# Patient Record
Sex: MALE | Race: WHITE | Employment: UNEMPLOYED | URBAN - METROPOLITAN AREA
[De-identification: names, ages, dates, MRNs, and addresses within clinical notes are randomized per-mention and may not be internally consistent; named-entity substitution may affect disease eponyms.]

---

## 2019-08-28 ENCOUNTER — APPOINTMENT (OUTPATIENT)
Dept: GENERAL RADIOLOGY | Age: 34
End: 2019-08-28
Payer: COMMERCIAL

## 2019-08-28 ENCOUNTER — HOSPITAL ENCOUNTER (EMERGENCY)
Age: 34
Discharge: HOME | End: 2019-08-28
Attending: EMERGENCY MEDICINE
Payer: COMMERCIAL

## 2019-08-28 VITALS
SYSTOLIC BLOOD PRESSURE: 133 MMHG | RESPIRATION RATE: 16 BRPM | BODY MASS INDEX: 28.92 KG/M2 | WEIGHT: 202 LBS | TEMPERATURE: 98.2 F | HEIGHT: 70 IN | HEART RATE: 70 BPM | DIASTOLIC BLOOD PRESSURE: 95 MMHG | OXYGEN SATURATION: 95 %

## 2019-08-28 DIAGNOSIS — R07.89 OTHER CHEST PAIN: Primary | ICD-10-CM

## 2019-08-28 LAB
A/G RATIO: 1.6 (ref 1.1–2.2)
ALBUMIN SERPL-MCNC: 4.7 G/DL (ref 3.4–5)
ALP BLD-CCNC: 91 U/L (ref 40–129)
ALT SERPL-CCNC: 41 U/L (ref 10–40)
ANION GAP SERPL CALCULATED.3IONS-SCNC: 12 MMOL/L (ref 3–16)
AST SERPL-CCNC: 47 U/L (ref 15–37)
BASOPHILS ABSOLUTE: 0.1 K/UL (ref 0–0.2)
BASOPHILS RELATIVE PERCENT: 1.4 %
BILIRUB SERPL-MCNC: <0.2 MG/DL (ref 0–1)
BUN BLDV-MCNC: 10 MG/DL (ref 7–20)
CALCIUM SERPL-MCNC: 9.5 MG/DL (ref 8.3–10.6)
CHLORIDE BLD-SCNC: 100 MMOL/L (ref 99–110)
CO2: 23 MMOL/L (ref 21–32)
CREAT SERPL-MCNC: 1.1 MG/DL (ref 0.9–1.3)
EKG ATRIAL RATE: 81 BPM
EKG DIAGNOSIS: NORMAL
EKG P AXIS: 45 DEGREES
EKG P-R INTERVAL: 162 MS
EKG Q-T INTERVAL: 426 MS
EKG QRS DURATION: 168 MS
EKG QTC CALCULATION (BAZETT): 494 MS
EKG R AXIS: -64 DEGREES
EKG T AXIS: 90 DEGREES
EKG VENTRICULAR RATE: 81 BPM
EOSINOPHILS ABSOLUTE: 0.9 K/UL (ref 0–0.6)
EOSINOPHILS RELATIVE PERCENT: 12.3 %
GFR AFRICAN AMERICAN: >60
GFR NON-AFRICAN AMERICAN: >60
GLOBULIN: 3 G/DL
GLUCOSE BLD-MCNC: 106 MG/DL (ref 70–99)
HCT VFR BLD CALC: 46.5 % (ref 40.5–52.5)
HEMOGLOBIN: 15.8 G/DL (ref 13.5–17.5)
INR BLD: 1.01 (ref 0.86–1.14)
LYMPHOCYTES ABSOLUTE: 1.9 K/UL (ref 1–5.1)
LYMPHOCYTES RELATIVE PERCENT: 25.7 %
MCH RBC QN AUTO: 31.3 PG (ref 26–34)
MCHC RBC AUTO-ENTMCNC: 34 G/DL (ref 31–36)
MCV RBC AUTO: 92.1 FL (ref 80–100)
MONOCYTES ABSOLUTE: 0.5 K/UL (ref 0–1.3)
MONOCYTES RELATIVE PERCENT: 7.1 %
NEUTROPHILS ABSOLUTE: 3.9 K/UL (ref 1.7–7.7)
NEUTROPHILS RELATIVE PERCENT: 53.5 %
PDW BLD-RTO: 13.4 % (ref 12.4–15.4)
PLATELET # BLD: 285 K/UL (ref 135–450)
PMV BLD AUTO: 8 FL (ref 5–10.5)
POTASSIUM SERPL-SCNC: 4.8 MMOL/L (ref 3.5–5.1)
PROTHROMBIN TIME: 11.5 SEC (ref 9.8–13)
RBC # BLD: 5.05 M/UL (ref 4.2–5.9)
SODIUM BLD-SCNC: 135 MMOL/L (ref 136–145)
TOTAL PROTEIN: 7.7 G/DL (ref 6.4–8.2)
TROPONIN: <0.01 NG/ML
TROPONIN: <0.01 NG/ML
WBC # BLD: 7.2 K/UL (ref 4–11)

## 2019-08-28 PROCEDURE — 85025 COMPLETE CBC W/AUTO DIFF WBC: CPT

## 2019-08-28 PROCEDURE — 71045 X-RAY EXAM CHEST 1 VIEW: CPT

## 2019-08-28 PROCEDURE — 85610 PROTHROMBIN TIME: CPT

## 2019-08-28 PROCEDURE — 93010 ELECTROCARDIOGRAM REPORT: CPT | Performed by: INTERNAL MEDICINE

## 2019-08-28 PROCEDURE — 36415 COLL VENOUS BLD VENIPUNCTURE: CPT

## 2019-08-28 PROCEDURE — 84484 ASSAY OF TROPONIN QUANT: CPT

## 2019-08-28 PROCEDURE — 80053 COMPREHEN METABOLIC PANEL: CPT

## 2019-08-28 PROCEDURE — 93005 ELECTROCARDIOGRAM TRACING: CPT | Performed by: EMERGENCY MEDICINE

## 2019-08-28 PROCEDURE — 6370000000 HC RX 637 (ALT 250 FOR IP): Performed by: EMERGENCY MEDICINE

## 2019-08-28 PROCEDURE — 99285 EMERGENCY DEPT VISIT HI MDM: CPT

## 2019-08-28 RX ORDER — CLONAZEPAM 0.5 MG/1
0.5 TABLET ORAL ONCE
Status: COMPLETED | OUTPATIENT
Start: 2019-08-28 | End: 2019-08-28

## 2019-08-28 RX ORDER — QUETIAPINE FUMARATE 25 MG/1
25 TABLET, FILM COATED ORAL DAILY
COMMUNITY
End: 2020-04-25

## 2019-08-28 RX ORDER — CITALOPRAM 40 MG/1
40 TABLET ORAL DAILY
COMMUNITY

## 2019-08-28 RX ORDER — CLONAZEPAM 0.5 MG/1
0.5 TABLET ORAL 2 TIMES DAILY PRN
COMMUNITY
End: 2021-07-01

## 2019-08-28 RX ADMIN — CLONAZEPAM 0.5 MG: 0.5 TABLET ORAL at 09:48

## 2019-08-28 ASSESSMENT — PAIN DESCRIPTION - PAIN TYPE: TYPE: ACUTE PAIN

## 2019-08-28 ASSESSMENT — PAIN DESCRIPTION - LOCATION: LOCATION: CHEST

## 2019-08-28 ASSESSMENT — PAIN DESCRIPTION - DESCRIPTORS: DESCRIPTORS: SHARP

## 2019-08-28 ASSESSMENT — PAIN DESCRIPTION - FREQUENCY: FREQUENCY: CONTINUOUS

## 2019-08-28 ASSESSMENT — PAIN SCALES - GENERAL: PAINLEVEL_OUTOF10: 7

## 2019-08-28 NOTE — LETTER
[facility] PHYSICAL [facility] ED  [address] [address]  Phone: [phone]               August 28, 2019    Patient: Idalia Dukes   YOB: 1985   Date of Visit: 8/28/2019       To Whom It May Concern:    Idalia Dukes was seen and treated in our emergency department on 8/28/2019. He may return to work on 08/29/2019.       Sincerely,       Kim Brooks RN         Signature:__________________________________

## 2019-08-28 NOTE — ED NOTES
Reviewed discharge instructions with pt, verbalized understanding,  Denies questions at this time. Ambulated off unit without assistance.       Unruly Rangel RN  08/28/19 1686

## 2020-04-25 ENCOUNTER — APPOINTMENT (OUTPATIENT)
Dept: CT IMAGING | Age: 35
End: 2020-04-25

## 2020-04-25 ENCOUNTER — APPOINTMENT (OUTPATIENT)
Dept: GENERAL RADIOLOGY | Age: 35
End: 2020-04-25

## 2020-04-25 ENCOUNTER — HOSPITAL ENCOUNTER (EMERGENCY)
Age: 35
Discharge: HOME | End: 2020-04-25
Attending: EMERGENCY MEDICINE

## 2020-04-25 VITALS
RESPIRATION RATE: 18 BRPM | HEIGHT: 69 IN | DIASTOLIC BLOOD PRESSURE: 110 MMHG | WEIGHT: 209 LBS | SYSTOLIC BLOOD PRESSURE: 137 MMHG | OXYGEN SATURATION: 95 % | HEART RATE: 107 BPM | TEMPERATURE: 97.8 F | BODY MASS INDEX: 30.96 KG/M2

## 2020-04-25 LAB
A/G RATIO: 1.5 (ref 1.1–2.2)
ALBUMIN SERPL-MCNC: 4.9 G/DL (ref 3.4–5)
ALP BLD-CCNC: 84 U/L (ref 40–129)
ALT SERPL-CCNC: 123 U/L (ref 10–40)
ANION GAP SERPL CALCULATED.3IONS-SCNC: 22 MMOL/L (ref 3–16)
AST SERPL-CCNC: 73 U/L (ref 15–37)
BASOPHILS ABSOLUTE: 0.2 K/UL (ref 0–0.2)
BASOPHILS RELATIVE PERCENT: 1.4 %
BILIRUB SERPL-MCNC: 0.8 MG/DL (ref 0–1)
BILIRUBIN URINE: NEGATIVE
BLOOD, URINE: NEGATIVE
BUN BLDV-MCNC: 6 MG/DL (ref 7–20)
CALCIUM SERPL-MCNC: 10.3 MG/DL (ref 8.3–10.6)
CHLORIDE BLD-SCNC: 95 MMOL/L (ref 99–110)
CLARITY: CLEAR
CO2: 24 MMOL/L (ref 21–32)
COLOR: YELLOW
CREAT SERPL-MCNC: 1.2 MG/DL (ref 0.9–1.3)
EKG ATRIAL RATE: 110 BPM
EKG DIAGNOSIS: NORMAL
EKG P AXIS: 51 DEGREES
EKG P-R INTERVAL: 148 MS
EKG Q-T INTERVAL: 396 MS
EKG QRS DURATION: 168 MS
EKG QTC CALCULATION (BAZETT): 535 MS
EKG R AXIS: -66 DEGREES
EKG T AXIS: 105 DEGREES
EKG VENTRICULAR RATE: 110 BPM
EOSINOPHILS ABSOLUTE: 0.1 K/UL (ref 0–0.6)
EOSINOPHILS RELATIVE PERCENT: 1.2 %
GFR AFRICAN AMERICAN: >60
GFR NON-AFRICAN AMERICAN: >60
GLOBULIN: 3.3 G/DL
GLUCOSE BLD-MCNC: 179 MG/DL (ref 70–99)
GLUCOSE URINE: NEGATIVE MG/DL
HCT VFR BLD CALC: 50.5 % (ref 40.5–52.5)
HEMOGLOBIN: 17.1 G/DL (ref 13.5–17.5)
KETONES, URINE: NEGATIVE MG/DL
LEUKOCYTE ESTERASE, URINE: NEGATIVE
LIPASE: 20 U/L (ref 13–60)
LYMPHOCYTES ABSOLUTE: 2.7 K/UL (ref 1–5.1)
LYMPHOCYTES RELATIVE PERCENT: 24.4 %
MCH RBC QN AUTO: 30.4 PG (ref 26–34)
MCHC RBC AUTO-ENTMCNC: 33.9 G/DL (ref 31–36)
MCV RBC AUTO: 89.8 FL (ref 80–100)
MICROSCOPIC EXAMINATION: NORMAL
MONOCYTES ABSOLUTE: 1 K/UL (ref 0–1.3)
MONOCYTES RELATIVE PERCENT: 9.1 %
NEUTROPHILS ABSOLUTE: 7 K/UL (ref 1.7–7.7)
NEUTROPHILS RELATIVE PERCENT: 63.9 %
NITRITE, URINE: NEGATIVE
PDW BLD-RTO: 13.9 % (ref 12.4–15.4)
PH UA: 5.5 (ref 5–8)
PLATELET # BLD: 323 K/UL (ref 135–450)
PMV BLD AUTO: 8.7 FL (ref 5–10.5)
POTASSIUM SERPL-SCNC: 3.6 MMOL/L (ref 3.5–5.1)
PROTEIN UA: NEGATIVE MG/DL
RBC # BLD: 5.62 M/UL (ref 4.2–5.9)
SODIUM BLD-SCNC: 141 MMOL/L (ref 136–145)
SPECIFIC GRAVITY UA: <=1.005 (ref 1–1.03)
TOTAL PROTEIN: 8.2 G/DL (ref 6.4–8.2)
TROPONIN: <0.01 NG/ML
URINE REFLEX TO CULTURE: NORMAL
URINE TYPE: NORMAL
UROBILINOGEN, URINE: 0.2 E.U./DL
WBC # BLD: 10.9 K/UL (ref 4–11)

## 2020-04-25 PROCEDURE — 96374 THER/PROPH/DIAG INJ IV PUSH: CPT

## 2020-04-25 PROCEDURE — 6360000002 HC RX W HCPCS: Performed by: EMERGENCY MEDICINE

## 2020-04-25 PROCEDURE — 93005 ELECTROCARDIOGRAM TRACING: CPT | Performed by: EMERGENCY MEDICINE

## 2020-04-25 PROCEDURE — 6370000000 HC RX 637 (ALT 250 FOR IP): Performed by: EMERGENCY MEDICINE

## 2020-04-25 PROCEDURE — 96361 HYDRATE IV INFUSION ADD-ON: CPT

## 2020-04-25 PROCEDURE — 99284 EMERGENCY DEPT VISIT MOD MDM: CPT

## 2020-04-25 PROCEDURE — 74177 CT ABD & PELVIS W/CONTRAST: CPT

## 2020-04-25 PROCEDURE — 93010 ELECTROCARDIOGRAM REPORT: CPT | Performed by: INTERNAL MEDICINE

## 2020-04-25 PROCEDURE — 2580000003 HC RX 258: Performed by: EMERGENCY MEDICINE

## 2020-04-25 PROCEDURE — 36415 COLL VENOUS BLD VENIPUNCTURE: CPT

## 2020-04-25 PROCEDURE — 84484 ASSAY OF TROPONIN QUANT: CPT

## 2020-04-25 PROCEDURE — 96375 TX/PRO/DX INJ NEW DRUG ADDON: CPT

## 2020-04-25 PROCEDURE — 80053 COMPREHEN METABOLIC PANEL: CPT

## 2020-04-25 PROCEDURE — 85025 COMPLETE CBC W/AUTO DIFF WBC: CPT

## 2020-04-25 PROCEDURE — 71045 X-RAY EXAM CHEST 1 VIEW: CPT

## 2020-04-25 PROCEDURE — 83690 ASSAY OF LIPASE: CPT

## 2020-04-25 PROCEDURE — 81003 URINALYSIS AUTO W/O SCOPE: CPT

## 2020-04-25 PROCEDURE — 6360000004 HC RX CONTRAST MEDICATION: Performed by: EMERGENCY MEDICINE

## 2020-04-25 RX ORDER — 0.9 % SODIUM CHLORIDE 0.9 %
1000 INTRAVENOUS SOLUTION INTRAVENOUS ONCE
Status: COMPLETED | OUTPATIENT
Start: 2020-04-25 | End: 2020-04-25

## 2020-04-25 RX ORDER — LORAZEPAM 2 MG/ML
1 INJECTION INTRAMUSCULAR ONCE
Status: COMPLETED | OUTPATIENT
Start: 2020-04-25 | End: 2020-04-25

## 2020-04-25 RX ORDER — DICYCLOMINE HYDROCHLORIDE 10 MG/1
10 CAPSULE ORAL EVERY 6 HOURS PRN
Qty: 20 CAPSULE | Refills: 0 | Status: SHIPPED | OUTPATIENT
Start: 2020-04-25 | End: 2021-07-01

## 2020-04-25 RX ORDER — ONDANSETRON 2 MG/ML
4 INJECTION INTRAMUSCULAR; INTRAVENOUS ONCE
Status: COMPLETED | OUTPATIENT
Start: 2020-04-25 | End: 2020-04-25

## 2020-04-25 RX ORDER — DICYCLOMINE HYDROCHLORIDE 10 MG/1
10 CAPSULE ORAL ONCE
Status: COMPLETED | OUTPATIENT
Start: 2020-04-25 | End: 2020-04-25

## 2020-04-25 RX ADMIN — LORAZEPAM 1 MG: 2 INJECTION INTRAMUSCULAR; INTRAVENOUS at 11:35

## 2020-04-25 RX ADMIN — SODIUM CHLORIDE 1000 ML: 9 INJECTION, SOLUTION INTRAVENOUS at 11:35

## 2020-04-25 RX ADMIN — ONDANSETRON HYDROCHLORIDE 4 MG: 2 INJECTION, SOLUTION INTRAMUSCULAR; INTRAVENOUS at 11:35

## 2020-04-25 RX ADMIN — DICYCLOMINE HYDROCHLORIDE 10 MG: 10 CAPSULE ORAL at 13:57

## 2020-04-25 RX ADMIN — IOPAMIDOL 75 ML: 755 INJECTION, SOLUTION INTRAVENOUS at 12:25

## 2020-04-25 ASSESSMENT — ENCOUNTER SYMPTOMS
PHOTOPHOBIA: 0
VOMITING: 1
STRIDOR: 0
BLOOD IN STOOL: 0
BACK PAIN: 0
SHORTNESS OF BREATH: 1
TROUBLE SWALLOWING: 0
WHEEZING: 0
ABDOMINAL PAIN: 1
VOICE CHANGE: 0
NAUSEA: 1
FACIAL SWELLING: 0
COLOR CHANGE: 0

## 2020-04-25 ASSESSMENT — PAIN DESCRIPTION - DESCRIPTORS: DESCRIPTORS: HEAVINESS

## 2020-04-25 ASSESSMENT — PAIN SCALES - GENERAL: PAINLEVEL_OUTOF10: 9

## 2020-04-25 ASSESSMENT — PAIN DESCRIPTION - LOCATION: LOCATION: ABDOMEN;CHEST

## 2020-04-25 NOTE — ED PROVIDER NOTES
meetings of clubs or organizations: None     Relationship status: None    Intimate partner violence     Fear of current or ex partner: None     Emotionally abused: None     Physically abused: None     Forced sexual activity: None   Other Topics Concern    None   Social History Narrative    None         PHYSICAL EXAM    (up to 7 for level 4, 8 or more for level 5)     ED Triage Vitals [04/25/20 1114]   BP Temp Temp Source Pulse Resp SpO2 Height Weight   (!) 161/127 97.8 °F (36.6 °C) Oral 116 16 94 % 5' 9\" (1.753 m) 209 lb (94.8 kg)       Physical Exam  Vitals signs and nursing note reviewed. Constitutional:       General: He is not in acute distress. Appearance: He is well-developed. Comments: Pleasant and cooperative. Nontoxic-appearing. In no acute distress. HENT:      Head: Normocephalic and atraumatic. Right Ear: External ear normal.      Left Ear: External ear normal.   Eyes:      Conjunctiva/sclera: Conjunctivae normal.   Neck:      Musculoskeletal: Neck supple. Vascular: No JVD. Trachea: No tracheal deviation. Cardiovascular:      Rate and Rhythm: Regular rhythm. Tachycardia present. Pulmonary:      Effort: Pulmonary effort is normal. No respiratory distress. Breath sounds: Normal breath sounds. No wheezing. Abdominal:      General: There is no distension. Palpations: Abdomen is soft. Tenderness: There is abdominal tenderness (Mild isolated epigastric tenderness with no other abdominal tenderness. No rebound, guarding, peritoneal signs. ). There is no guarding or rebound. Musculoskeletal: Normal range of motion. General: No tenderness. Comments: No lower extremity swelling, tenderness, or palpable cord. Negative Ramesh test bilaterally. Skin:     General: Skin is warm and dry. Neurological:      General: No focal deficit present. Mental Status: He is alert and oriented to person, place, and time.       Cranial Nerves: No cranial nerve

## 2020-04-25 NOTE — DISCHARGE INSTR - COC
{Prognosis:6087294793}    Recommended Labs or Other Treatments After Discharge: ***    Physician Certification: I certify the above information and transfer of Tejal Olsen  is necessary for the continuing treatment of the diagnosis listed and that he requires {Admit to Appropriate Level of Care:06655} for {GREATER/LESS:770104160} 30 days.      Update Admission H&P: {CHP DME Changes in DOOKI:247112421}    PHYSICIAN SIGNATURE:  {Esignature:289488003}

## 2020-04-27 ENCOUNTER — TELEPHONE (OUTPATIENT)
Dept: SURGERY | Age: 35
End: 2020-04-27

## 2020-04-28 ENCOUNTER — TELEPHONE (OUTPATIENT)
Dept: SURGERY | Age: 35
End: 2020-04-28

## 2020-04-29 ENCOUNTER — HOSPITAL ENCOUNTER (OUTPATIENT)
Age: 35
Discharge: HOME | End: 2020-04-29

## 2020-04-29 PROCEDURE — 83013 H PYLORI (C-13) BREATH: CPT

## 2020-05-01 LAB — H PYLORI BREATH TEST: NEGATIVE

## 2020-05-22 ENCOUNTER — HOSPITAL ENCOUNTER (OUTPATIENT)
Age: 35
Discharge: HOME | End: 2020-05-22
Payer: COMMERCIAL

## 2020-05-22 LAB
ALBUMIN SERPL-MCNC: 4.6 G/DL (ref 3.4–5)
ALP BLD-CCNC: 76 U/L (ref 40–129)
ALT SERPL-CCNC: 46 U/L (ref 10–40)
ANION GAP SERPL CALCULATED.3IONS-SCNC: 11 MMOL/L (ref 3–16)
AST SERPL-CCNC: 28 U/L (ref 15–37)
BASOPHILS ABSOLUTE: 0.1 K/UL (ref 0–0.2)
BASOPHILS RELATIVE PERCENT: 0.8 %
BILIRUB SERPL-MCNC: 0.4 MG/DL (ref 0–1)
BILIRUBIN DIRECT: <0.2 MG/DL (ref 0–0.3)
BILIRUBIN, INDIRECT: ABNORMAL MG/DL (ref 0–1)
BUN BLDV-MCNC: 13 MG/DL (ref 7–20)
CALCIUM SERPL-MCNC: 9.7 MG/DL (ref 8.3–10.6)
CHLORIDE BLD-SCNC: 100 MMOL/L (ref 99–110)
CO2: 27 MMOL/L (ref 21–32)
CREAT SERPL-MCNC: 1.1 MG/DL (ref 0.9–1.3)
EOSINOPHILS ABSOLUTE: 0.4 K/UL (ref 0–0.6)
EOSINOPHILS RELATIVE PERCENT: 5 %
GFR AFRICAN AMERICAN: >60
GFR NON-AFRICAN AMERICAN: >60
GLUCOSE BLD-MCNC: 143 MG/DL (ref 70–99)
HCT VFR BLD CALC: 43.3 % (ref 40.5–52.5)
HEMOGLOBIN: 14.8 G/DL (ref 13.5–17.5)
LYMPHOCYTES ABSOLUTE: 1.8 K/UL (ref 1–5.1)
LYMPHOCYTES RELATIVE PERCENT: 21.7 %
MCH RBC QN AUTO: 31.3 PG (ref 26–34)
MCHC RBC AUTO-ENTMCNC: 34.2 G/DL (ref 31–36)
MCV RBC AUTO: 91.7 FL (ref 80–100)
MONOCYTES ABSOLUTE: 0.5 K/UL (ref 0–1.3)
MONOCYTES RELATIVE PERCENT: 5.6 %
NEUTROPHILS ABSOLUTE: 5.5 K/UL (ref 1.7–7.7)
NEUTROPHILS RELATIVE PERCENT: 66.9 %
PDW BLD-RTO: 14.5 % (ref 12.4–15.4)
PHOSPHORUS: 3.2 MG/DL (ref 2.5–4.9)
PLATELET # BLD: 252 K/UL (ref 135–450)
PMV BLD AUTO: 8.1 FL (ref 5–10.5)
POTASSIUM SERPL-SCNC: 4 MMOL/L (ref 3.5–5.1)
RBC # BLD: 4.72 M/UL (ref 4.2–5.9)
SODIUM BLD-SCNC: 138 MMOL/L (ref 136–145)
TOTAL PROTEIN: 7.3 G/DL (ref 6.4–8.2)
URIC ACID, SERUM: 12.4 MG/DL (ref 3.5–7.2)
WBC # BLD: 8.2 K/UL (ref 4–11)

## 2020-05-22 PROCEDURE — 36415 COLL VENOUS BLD VENIPUNCTURE: CPT

## 2020-05-22 PROCEDURE — 84550 ASSAY OF BLOOD/URIC ACID: CPT

## 2020-05-22 PROCEDURE — 85025 COMPLETE CBC W/AUTO DIFF WBC: CPT

## 2020-05-22 PROCEDURE — 80076 HEPATIC FUNCTION PANEL: CPT

## 2020-05-22 PROCEDURE — 80069 RENAL FUNCTION PANEL: CPT

## 2020-07-17 ENCOUNTER — APPOINTMENT (OUTPATIENT)
Dept: GENERAL RADIOLOGY | Age: 35
DRG: 392 | End: 2020-07-17

## 2020-07-17 ENCOUNTER — APPOINTMENT (OUTPATIENT)
Dept: CT IMAGING | Age: 35
DRG: 392 | End: 2020-07-17

## 2020-07-17 ENCOUNTER — HOSPITAL ENCOUNTER (INPATIENT)
Age: 35
LOS: 2 days | Discharge: HOME | DRG: 392 | End: 2020-07-19
Attending: EMERGENCY MEDICINE | Admitting: INTERNAL MEDICINE
Payer: COMMERCIAL

## 2020-07-17 PROBLEM — R11.2 NAUSEA & VOMITING: Status: ACTIVE | Noted: 2020-07-17

## 2020-07-17 LAB
A/G RATIO: 1.7 (ref 1.1–2.2)
ALBUMIN SERPL-MCNC: 5.1 G/DL (ref 3.4–5)
ALP BLD-CCNC: 97 U/L (ref 40–129)
ALT SERPL-CCNC: 194 U/L (ref 10–40)
AMORPHOUS: NORMAL /HPF
AMPHETAMINE SCREEN, URINE: NORMAL
ANION GAP SERPL CALCULATED.3IONS-SCNC: 20 MMOL/L (ref 3–16)
AST SERPL-CCNC: 136 U/L (ref 15–37)
BARBITURATE SCREEN URINE: NORMAL
BASOPHILS ABSOLUTE: 0.1 K/UL (ref 0–0.2)
BASOPHILS RELATIVE PERCENT: 0.8 %
BENZODIAZEPINE SCREEN, URINE: NORMAL
BILIRUB SERPL-MCNC: 0.7 MG/DL (ref 0–1)
BILIRUBIN URINE: NEGATIVE
BLOOD, URINE: ABNORMAL
BUN BLDV-MCNC: 6 MG/DL (ref 7–20)
C DIFF TOXIN/ANTIGEN: NORMAL
CALCIUM SERPL-MCNC: 10.3 MG/DL (ref 8.3–10.6)
CANNABINOID SCREEN URINE: NORMAL
CHLORIDE BLD-SCNC: 94 MMOL/L (ref 99–110)
CLARITY: CLEAR
CO2: 21 MMOL/L (ref 21–32)
COCAINE METABOLITE SCREEN URINE: NORMAL
COLOR: YELLOW
CREAT SERPL-MCNC: 1 MG/DL (ref 0.9–1.3)
D DIMER: <200 NG/ML DDU (ref 0–229)
EKG ATRIAL RATE: 127 BPM
EKG DIAGNOSIS: NORMAL
EKG P AXIS: 34 DEGREES
EKG P-R INTERVAL: 100 MS
EKG Q-T INTERVAL: 338 MS
EKG QRS DURATION: 164 MS
EKG QTC CALCULATION (BAZETT): 491 MS
EKG R AXIS: -68 DEGREES
EKG T AXIS: 86 DEGREES
EKG VENTRICULAR RATE: 127 BPM
EOSINOPHILS ABSOLUTE: 0.1 K/UL (ref 0–0.6)
EOSINOPHILS RELATIVE PERCENT: 0.6 %
EPITHELIAL CELLS, UA: NORMAL /HPF (ref 0–5)
ETHANOL: 15 MG/DL (ref 0–0.08)
GFR AFRICAN AMERICAN: >60
GFR NON-AFRICAN AMERICAN: >60
GLOBULIN: 3 G/DL
GLUCOSE BLD-MCNC: 160 MG/DL (ref 70–99)
GLUCOSE URINE: NEGATIVE MG/DL
HCT VFR BLD CALC: 50.7 % (ref 40.5–52.5)
HEMOGLOBIN: 17.7 G/DL (ref 13.5–17.5)
KETONES, URINE: NEGATIVE MG/DL
LEUKOCYTE ESTERASE, URINE: NEGATIVE
LIPASE: 18 U/L (ref 13–60)
LYMPHOCYTES ABSOLUTE: 2.3 K/UL (ref 1–5.1)
LYMPHOCYTES RELATIVE PERCENT: 19.2 %
Lab: NORMAL
MCH RBC QN AUTO: 32.7 PG (ref 26–34)
MCHC RBC AUTO-ENTMCNC: 34.9 G/DL (ref 31–36)
MCV RBC AUTO: 93.9 FL (ref 80–100)
METHADONE SCREEN, URINE: NORMAL
MICROSCOPIC EXAMINATION: YES
MONOCYTES ABSOLUTE: 0.7 K/UL (ref 0–1.3)
MONOCYTES RELATIVE PERCENT: 5.9 %
NEUTROPHILS ABSOLUTE: 9 K/UL (ref 1.7–7.7)
NEUTROPHILS RELATIVE PERCENT: 73.5 %
NITRITE, URINE: NEGATIVE
OPIATE SCREEN URINE: NORMAL
OXYCODONE URINE: NORMAL
PDW BLD-RTO: 13.7 % (ref 12.4–15.4)
PH UA: 5
PH UA: 5 (ref 5–8)
PHENCYCLIDINE SCREEN URINE: NORMAL
PLATELET # BLD: 421 K/UL (ref 135–450)
PMV BLD AUTO: 7.7 FL (ref 5–10.5)
POTASSIUM REFLEX MAGNESIUM: 3.9 MMOL/L (ref 3.5–5.1)
PROPOXYPHENE SCREEN: NORMAL
PROTEIN UA: ABNORMAL MG/DL
RBC # BLD: 5.4 M/UL (ref 4.2–5.9)
RBC UA: NORMAL /HPF (ref 0–4)
SODIUM BLD-SCNC: 135 MMOL/L (ref 136–145)
SPECIFIC GRAVITY UA: 1.02 (ref 1–1.03)
TOTAL PROTEIN: 8.1 G/DL (ref 6.4–8.2)
TROPONIN: <0.01 NG/ML
TROPONIN: <0.01 NG/ML
URINE REFLEX TO CULTURE: ABNORMAL
URINE TYPE: ABNORMAL
UROBILINOGEN, URINE: 0.2 E.U./DL
WBC # BLD: 12.2 K/UL (ref 4–11)
WBC UA: NORMAL /HPF (ref 0–5)
WHITE BLOOD CELLS (WBC), STOOL: ABNORMAL

## 2020-07-17 PROCEDURE — 87449 NOS EACH ORGANISM AG IA: CPT

## 2020-07-17 PROCEDURE — 2500000003 HC RX 250 WO HCPCS: Performed by: INTERNAL MEDICINE

## 2020-07-17 PROCEDURE — 84484 ASSAY OF TROPONIN QUANT: CPT

## 2020-07-17 PROCEDURE — 2580000003 HC RX 258: Performed by: PHYSICIAN ASSISTANT

## 2020-07-17 PROCEDURE — 74177 CT ABD & PELVIS W/CONTRAST: CPT

## 2020-07-17 PROCEDURE — 81001 URINALYSIS AUTO W/SCOPE: CPT

## 2020-07-17 PROCEDURE — 36415 COLL VENOUS BLD VENIPUNCTURE: CPT

## 2020-07-17 PROCEDURE — 6360000002 HC RX W HCPCS: Performed by: INTERNAL MEDICINE

## 2020-07-17 PROCEDURE — 6370000000 HC RX 637 (ALT 250 FOR IP): Performed by: INTERNAL MEDICINE

## 2020-07-17 PROCEDURE — 6360000002 HC RX W HCPCS: Performed by: EMERGENCY MEDICINE

## 2020-07-17 PROCEDURE — 83630 LACTOFERRIN FECAL (QUAL): CPT

## 2020-07-17 PROCEDURE — 83690 ASSAY OF LIPASE: CPT

## 2020-07-17 PROCEDURE — 87324 CLOSTRIDIUM AG IA: CPT

## 2020-07-17 PROCEDURE — 96376 TX/PRO/DX INJ SAME DRUG ADON: CPT

## 2020-07-17 PROCEDURE — 99291 CRITICAL CARE FIRST HOUR: CPT

## 2020-07-17 PROCEDURE — 96375 TX/PRO/DX INJ NEW DRUG ADDON: CPT

## 2020-07-17 PROCEDURE — G0480 DRUG TEST DEF 1-7 CLASSES: HCPCS

## 2020-07-17 PROCEDURE — 96374 THER/PROPH/DIAG INJ IV PUSH: CPT

## 2020-07-17 PROCEDURE — 80307 DRUG TEST PRSMV CHEM ANLYZR: CPT

## 2020-07-17 PROCEDURE — 6360000004 HC RX CONTRAST MEDICATION: Performed by: EMERGENCY MEDICINE

## 2020-07-17 PROCEDURE — 85025 COMPLETE CBC W/AUTO DIFF WBC: CPT

## 2020-07-17 PROCEDURE — 71045 X-RAY EXAM CHEST 1 VIEW: CPT

## 2020-07-17 PROCEDURE — 2500000003 HC RX 250 WO HCPCS: Performed by: PHYSICIAN ASSISTANT

## 2020-07-17 PROCEDURE — 85379 FIBRIN DEGRADATION QUANT: CPT

## 2020-07-17 PROCEDURE — 84443 ASSAY THYROID STIM HORMONE: CPT

## 2020-07-17 PROCEDURE — 93005 ELECTROCARDIOGRAM TRACING: CPT | Performed by: EMERGENCY MEDICINE

## 2020-07-17 PROCEDURE — 6360000002 HC RX W HCPCS: Performed by: PHYSICIAN ASSISTANT

## 2020-07-17 PROCEDURE — C9113 INJ PANTOPRAZOLE SODIUM, VIA: HCPCS | Performed by: INTERNAL MEDICINE

## 2020-07-17 PROCEDURE — 80053 COMPREHEN METABOLIC PANEL: CPT

## 2020-07-17 PROCEDURE — 1200000000 HC SEMI PRIVATE

## 2020-07-17 PROCEDURE — 93010 ELECTROCARDIOGRAM REPORT: CPT | Performed by: INTERNAL MEDICINE

## 2020-07-17 PROCEDURE — 2580000003 HC RX 258: Performed by: INTERNAL MEDICINE

## 2020-07-17 PROCEDURE — 93288 INTERROG EVL PM/LDLS PM IP: CPT | Performed by: INTERNAL MEDICINE

## 2020-07-17 RX ORDER — CLONAZEPAM 0.5 MG/1
0.5 TABLET ORAL 2 TIMES DAILY PRN
Status: DISCONTINUED | OUTPATIENT
Start: 2020-07-17 | End: 2020-07-19 | Stop reason: HOSPADM

## 2020-07-17 RX ORDER — LORAZEPAM 2 MG/ML
2 INJECTION INTRAMUSCULAR ONCE
Status: COMPLETED | OUTPATIENT
Start: 2020-07-17 | End: 2020-07-17

## 2020-07-17 RX ORDER — CITALOPRAM 20 MG/1
40 TABLET ORAL DAILY
Status: DISCONTINUED | OUTPATIENT
Start: 2020-07-17 | End: 2020-07-19 | Stop reason: HOSPADM

## 2020-07-17 RX ORDER — ACETAMINOPHEN 325 MG/1
650 TABLET ORAL EVERY 6 HOURS PRN
Status: DISCONTINUED | OUTPATIENT
Start: 2020-07-17 | End: 2020-07-19 | Stop reason: HOSPADM

## 2020-07-17 RX ORDER — ONDANSETRON 2 MG/ML
4 INJECTION INTRAMUSCULAR; INTRAVENOUS ONCE
Status: COMPLETED | OUTPATIENT
Start: 2020-07-17 | End: 2020-07-17

## 2020-07-17 RX ORDER — ACETAMINOPHEN 650 MG/1
650 SUPPOSITORY RECTAL EVERY 6 HOURS PRN
Status: DISCONTINUED | OUTPATIENT
Start: 2020-07-17 | End: 2020-07-19 | Stop reason: HOSPADM

## 2020-07-17 RX ORDER — PROMETHAZINE HYDROCHLORIDE 25 MG/1
12.5 TABLET ORAL EVERY 6 HOURS PRN
Status: DISCONTINUED | OUTPATIENT
Start: 2020-07-17 | End: 2020-07-19 | Stop reason: HOSPADM

## 2020-07-17 RX ORDER — CALCIUM CARBONATE 200(500)MG
500 TABLET,CHEWABLE ORAL 3 TIMES DAILY PRN
Status: DISCONTINUED | OUTPATIENT
Start: 2020-07-17 | End: 2020-07-19 | Stop reason: HOSPADM

## 2020-07-17 RX ORDER — SODIUM CHLORIDE 0.9 % (FLUSH) 0.9 %
10 SYRINGE (ML) INJECTION EVERY 12 HOURS SCHEDULED
Status: DISCONTINUED | OUTPATIENT
Start: 2020-07-17 | End: 2020-07-19 | Stop reason: HOSPADM

## 2020-07-17 RX ORDER — LORAZEPAM 2 MG/ML
1 INJECTION INTRAMUSCULAR ONCE
Status: COMPLETED | OUTPATIENT
Start: 2020-07-17 | End: 2020-07-17

## 2020-07-17 RX ORDER — POLYETHYLENE GLYCOL 3350 17 G/17G
17 POWDER, FOR SOLUTION ORAL DAILY PRN
Status: DISCONTINUED | OUTPATIENT
Start: 2020-07-17 | End: 2020-07-19 | Stop reason: HOSPADM

## 2020-07-17 RX ORDER — 0.9 % SODIUM CHLORIDE 0.9 %
1000 INTRAVENOUS SOLUTION INTRAVENOUS ONCE
Status: COMPLETED | OUTPATIENT
Start: 2020-07-17 | End: 2020-07-17

## 2020-07-17 RX ORDER — SODIUM CHLORIDE 9 MG/ML
INJECTION, SOLUTION INTRAVENOUS CONTINUOUS
Status: DISCONTINUED | OUTPATIENT
Start: 2020-07-17 | End: 2020-07-19 | Stop reason: HOSPADM

## 2020-07-17 RX ORDER — DICYCLOMINE HYDROCHLORIDE 10 MG/1
10 CAPSULE ORAL EVERY 6 HOURS PRN
Status: DISCONTINUED | OUTPATIENT
Start: 2020-07-17 | End: 2020-07-19 | Stop reason: HOSPADM

## 2020-07-17 RX ORDER — PANTOPRAZOLE SODIUM 40 MG/10ML
40 INJECTION, POWDER, LYOPHILIZED, FOR SOLUTION INTRAVENOUS DAILY
Status: DISCONTINUED | OUTPATIENT
Start: 2020-07-17 | End: 2020-07-19 | Stop reason: HOSPADM

## 2020-07-17 RX ORDER — ONDANSETRON 2 MG/ML
4 INJECTION INTRAMUSCULAR; INTRAVENOUS EVERY 6 HOURS PRN
Status: DISCONTINUED | OUTPATIENT
Start: 2020-07-17 | End: 2020-07-19 | Stop reason: HOSPADM

## 2020-07-17 RX ORDER — SODIUM CHLORIDE 0.9 % (FLUSH) 0.9 %
10 SYRINGE (ML) INJECTION PRN
Status: DISCONTINUED | OUTPATIENT
Start: 2020-07-17 | End: 2020-07-19 | Stop reason: HOSPADM

## 2020-07-17 RX ADMIN — LORAZEPAM 2 MG: 2 INJECTION, SOLUTION INTRAMUSCULAR; INTRAVENOUS at 16:35

## 2020-07-17 RX ADMIN — LORAZEPAM 1 MG: 2 INJECTION, SOLUTION INTRAMUSCULAR; INTRAVENOUS at 12:38

## 2020-07-17 RX ADMIN — IOPAMIDOL 75 ML: 755 INJECTION, SOLUTION INTRAVENOUS at 13:56

## 2020-07-17 RX ADMIN — SODIUM CHLORIDE 1000 ML: 9 INJECTION, SOLUTION INTRAVENOUS at 12:15

## 2020-07-17 RX ADMIN — SODIUM CHLORIDE: 9 INJECTION, SOLUTION INTRAVENOUS at 18:39

## 2020-07-17 RX ADMIN — ANTACID TABLETS 500 MG: 500 TABLET, CHEWABLE ORAL at 18:58

## 2020-07-17 RX ADMIN — LORAZEPAM 1 MG: 2 INJECTION, SOLUTION INTRAMUSCULAR; INTRAVENOUS at 15:03

## 2020-07-17 RX ADMIN — ONDANSETRON 4 MG: 2 INJECTION INTRAMUSCULAR; INTRAVENOUS at 12:15

## 2020-07-17 RX ADMIN — FAMOTIDINE 20 MG: 10 INJECTION, SOLUTION INTRAVENOUS at 14:35

## 2020-07-17 RX ADMIN — ONDANSETRON 4 MG: 2 INJECTION INTRAMUSCULAR; INTRAVENOUS at 14:35

## 2020-07-17 RX ADMIN — SODIUM CHLORIDE 1000 ML: 9 INJECTION, SOLUTION INTRAVENOUS at 13:43

## 2020-07-17 RX ADMIN — Medication 10 ML: at 22:00

## 2020-07-17 RX ADMIN — FOLIC ACID: 5 INJECTION, SOLUTION INTRAMUSCULAR; INTRAVENOUS; SUBCUTANEOUS at 18:54

## 2020-07-17 RX ADMIN — CLONAZEPAM 0.5 MG: 0.5 TABLET ORAL at 18:39

## 2020-07-17 RX ADMIN — PANTOPRAZOLE SODIUM 40 MG: 40 INJECTION, POWDER, FOR SOLUTION INTRAVENOUS at 18:39

## 2020-07-17 RX ADMIN — ENOXAPARIN SODIUM 40 MG: 40 INJECTION SUBCUTANEOUS at 18:39

## 2020-07-17 RX ADMIN — Medication 10 ML: at 18:40

## 2020-07-17 RX ADMIN — CITALOPRAM HYDROBROMIDE 40 MG: 20 TABLET ORAL at 18:39

## 2020-07-17 ASSESSMENT — PAIN DESCRIPTION - PAIN TYPE: TYPE: ACUTE PAIN

## 2020-07-17 ASSESSMENT — ENCOUNTER SYMPTOMS
ABDOMINAL PAIN: 1
NAUSEA: 1
COUGH: 0
VOMITING: 1
SHORTNESS OF BREATH: 0
DIARRHEA: 1
EYES NEGATIVE: 1
COLOR CHANGE: 0

## 2020-07-17 ASSESSMENT — PAIN DESCRIPTION - LOCATION: LOCATION: CHEST

## 2020-07-17 ASSESSMENT — PAIN DESCRIPTION - DESCRIPTORS: DESCRIPTORS: STABBING

## 2020-07-17 ASSESSMENT — PAIN DESCRIPTION - ORIENTATION: ORIENTATION: MID

## 2020-07-17 ASSESSMENT — PAIN SCALES - GENERAL: PAINLEVEL_OUTOF10: 8

## 2020-07-17 NOTE — ED PROVIDER NOTES
[facility]  ED  EMERGENCY DEPARTMENT ENCOUNTER        Pt Name: Arely Simpson  MRN: [id number]  [place] 1985  Date of evaluation: 7/17/2020  Provider: Samaria Diamond PA-C  PCP: Romero Mejía  ED Attending: Kojo Celeste      This patient was seen by the attending provider     History provided by the patient    CHIEF COMPLAINT:     Chief Complaint   Patient presents with    Emesis    Chest Pain     MID CHEST PAIN       HISTORY OF PRESENT ILLNESS:      Arely Simpson is a 36 y.o. male who arrives to the ED by private vehicle. The patient reports waking up at 6 AM.  He states he has been experiencing persistent nausea, vomiting and diarrhea since that time. He estimates approximately 8 episodes of each. He additionally describes mid chest pain that is \"stabbing\" in nature. Patient has a history of anxiety, depression and has a pacemaker in place (upon reviewing records in [facility]/care everywhere) appears to be result of complete heart block. Patient reports being established with primary care and cardiology ([facility]). He cannot identify exacerbating or alleviating factors to his symptoms at this time. Only upon questioning the patient further, he admits that he ran out of the Klonopin which he is prescribed for anxiety. He ran out about 2 weeks ago. He reports drinking alcohol last night. Initially reports 6 beers. He states he does not drink regularly. Nursing Notes were reviewed     REVIEW OF SYSTEMS:     Review of Systems   Constitutional: Positive for appetite change. Negative for chills and fever. HENT: Negative. Eyes: Negative. Respiratory: Negative for cough and shortness of breath. Cardiovascular: Positive for chest pain. Gastrointestinal: Positive for abdominal pain, diarrhea (x8), nausea and vomiting (x8). Genitourinary: Negative for difficulty urinating and dysuria. Musculoskeletal: Negative for gait problem and neck pain.    Skin: Negative for color change. Neurological: Negative for headaches. All other systems reviewed and are negative. Except as noted above in the ROS, all other systems were reviewed and negative. PAST MEDICAL HISTORY:     Past Medical History:   Diagnosis Date    Depression          SURGICAL HISTORY:      Past Surgical History:   Procedure Laterality Date    PACEMAKER INSERTION           CURRENT MEDICATIONS:       Previous Medications    CITALOPRAM (CELEXA) 40 MG TABLET    Take 40 mg by mouth daily    CLONAZEPAM (KLONOPIN) 0.5 MG TABLET    Take 0.5 mg by mouth 2 times daily as needed. DICYCLOMINE (BENTYL) 10 MG CAPSULE    Take 1 capsule by mouth every 6 hours as needed (cramps)         ALLERGIES:    Patient has no known allergies. FAMILY HISTORY:     No family history on file.        SOCIAL HISTORY:       Social History     Socioeconomic History    Marital status: Single     Spouse name: Not on file    Number of children: Not on file    Years of education: Not on file    Highest education level: Not on file   Occupational History    Not on file   Social Needs    Financial resource strain: Not on file    Food insecurity     Worry: Not on file     Inability: Not on file    Transportation needs     Medical: Not on file     Non-medical: Not on file   Tobacco Use    Smoking status: Former Smoker     Years: 5.00    Smokeless tobacco: Never Used    Tobacco comment: vap   Substance and Sexual Activity    Alcohol use: Yes     Comment: occasionally    Drug use: Not on file    Sexual activity: Not on file   Lifestyle    Physical activity     Days per week: Not on file     Minutes per session: Not on file    Stress: Not on file   Relationships    Social connections     Talks on phone: Not on file     Gets together: Not on file     Attends Protestant service: Not on file     Active member of club or organization: Not on file     Attends meetings of clubs or organizations: Not on file     Relationship status: Not on file    Intimate partner violence     Fear of current or ex partner: Not on file     Emotionally abused: Not on file     Physically abused: Not on file     Forced sexual activity: Not on file   Other Topics Concern    Not on file   Social History Narrative    Not on file       SCREENINGS:             PHYSICAL EXAM:       ED Triage Vitals   BP Temp Temp Source Pulse Resp SpO2 Height Weight   07/17/20 1205 07/17/20 1200 07/17/20 1200 07/17/20 1200 07/17/20 1200 07/17/20 1200 07/17/20 1200 07/17/20 1200   (!) 148/125 98.7 °F (37.1 °C) Oral 126 18 96 % 5' 9\" (1.753 m) 212 lb (96.2 kg)       Physical Exam    CONSTITUTIONAL: Awake and alert. Cooperative. Well-developed. Well-nourished. Patient is diaphoretic, tachycardic and appears uncomfortable  HENT: Normocephalic. Atraumatic. External ears normal, without discharge. No nasal discharge. Oropharynx clear. Mucous membranes moist.  EYES: Conjunctiva non-injected. No scleral icterus. PERRL. EOM's grossly intact. NECK: Supple. Normal ROM. CARDIOVASCULAR: Tachycardia with regular rhythm. No Murmer. Intact distal pulses. PULMONARY/CHEST WALL: Effort normal. No tachypnea. Lungs clear to ausculation. ABDOMEN: Normal BS. Soft. Nondistended. Mild, generalized tenderness to palpate. No guarding. /ANORECTAL: Not assessed  MUSKULOSKELETAL: Normal ROM. No acute deformities. No edema. No tenderness to palpate. SKIN: Diaphoretic. No rash. NEUROLOGICAL: Alert and oriented x 3. GCS 15. CN II-XII grossly intact. Strength is 5/5 in all extremities and sensation is intact. Normal gait.    PSYCHIATRIC: Normal affect        DIAGNOSTICRESULTS:     LABS:    Results for orders placed or performed during the hospital encounter of 07/17/20   CBC Auto Differential   Result Value Ref Range    WBC 12.2 (H) 4.0 - 11.0 K/uL    RBC 5.40 4.20 - 5.90 M/uL    Hemoglobin 17.7 (H) 13.5 - 17.5 g/dL    Hematocrit 50.7 40.5 - 52.5 %    MCV 93.9 80.0 - 100.0 fL    MCH 32.7 26.0 - 34.0 pg MCHC 34.9 31.0 - 36.0 g/dL    RDW 13.7 12.4 - 15.4 %    Platelets 277 676 - 413 K/uL    MPV 7.7 5.0 - 10.5 fL    Neutrophils % 73.5 %    Lymphocytes % 19.2 %    Monocytes % 5.9 %    Eosinophils % 0.6 %    Basophils % 0.8 %    Neutrophils Absolute 9.0 (H) 1.7 - 7.7 K/uL    Lymphocytes Absolute 2.3 1.0 - 5.1 K/uL    Monocytes Absolute 0.7 0.0 - 1.3 K/uL    Eosinophils Absolute 0.1 0.0 - 0.6 K/uL    Basophils Absolute 0.1 0.0 - 0.2 K/uL   Comprehensive Metabolic Panel w/ Reflex to MG   Result Value Ref Range    Sodium 135 (L) 136 - 145 mmol/L    Potassium reflex Magnesium 3.9 3.5 - 5.1 mmol/L    Chloride 94 (L) 99 - 110 mmol/L    CO2 21 21 - 32 mmol/L    Anion Gap 20 (H) 3 - 16    Glucose 160 (H) 70 - 99 mg/dL    BUN 6 (L) 7 - 20 mg/dL    CREATININE 1.0 0.9 - 1.3 mg/dL    GFR Non-African American >60 >60    GFR African American >60 >60    Calcium 10.3 8.3 - 10.6 mg/dL    Total Protein 8.1 6.4 - 8.2 g/dL    Alb 5.1 (H) 3.4 - 5.0 g/dL    Albumin/Globulin Ratio 1.7 1.1 - 2.2    Total Bilirubin 0.7 0.0 - 1.0 mg/dL    Alkaline Phosphatase 97 40 - 129 U/L     (H) 10 - 40 U/L     (H) 15 - 37 U/L    Globulin 3.0 g/dL   Lipase   Result Value Ref Range    Lipase 18.0 13.0 - 60.0 U/L   Troponin   Result Value Ref Range    Troponin <0.01 <0.01 ng/mL   Urinalysis Reflex to Culture    Specimen: Urine, clean catch   Result Value Ref Range    Color, UA Yellow Straw/Yellow    Clarity, UA Clear Clear    Glucose, Ur Negative Negative mg/dL    Bilirubin Urine Negative Negative    Ketones, Urine Negative Negative mg/dL    Specific Gravity, UA 1.025 1.005 - 1.030    Blood, Urine TRACE-INTACT (A) Negative    pH, UA 5.0 5.0 - 8.0    Protein, UA TRACE (A) Negative mg/dL    Urobilinogen, Urine 0.2 <2.0 E.U./dL    Nitrite, Urine Negative Negative    Leukocyte Esterase, Urine Negative Negative    Microscopic Examination YES     Urine Type Voided     Urine Reflex to Culture Not Indicated    Urine Drug Screen   Result Value Ref Range Amphetamine Screen, Urine Neg Negative <1000ng/mL    Barbiturate Screen, Ur Neg Negative <200 ng/mL    Benzodiazepine Screen, Urine Neg Negative <200 ng/mL    Cannabinoid Scrn, Ur Neg Negative <50 ng/mL    Cocaine Metabolite Screen, Urine Neg Negative <300 ng/mL    Opiate Scrn, Ur Neg Negative <300 ng/mL    PCP Screen, Urine Neg Negative <25 ng/mL    Methadone Screen, Urine Neg Negative <300 ng/mL    Propoxyphene Scrn, Ur Neg Negative <300 ng/mL    Oxycodone Urine Neg Negative <100 ng/ml    pH, UA 5.0     Drug Screen Comment: see below    Ethanol   Result Value Ref Range    Ethanol Lvl 15 mg/dL   Microscopic Urinalysis   Result Value Ref Range    WBC, UA None seen 0 - 5 /HPF    RBC, UA 0-2 0 - 4 /HPF    Epithelial Cells, UA 0-1 0 - 5 /HPF    Amorphous, UA Rare /HPF   D-dimer, quantitative   Result Value Ref Range    D-Dimer, Quant <200 0 - 229 ng/mL DDU   EKG 12 Lead   Result Value Ref Range    Ventricular Rate 127 BPM    Atrial Rate 127 BPM    P-R Interval 100 ms    QRS Duration 164 ms    Q-T Interval 338 ms    QTc Calculation (Bazett) 491 ms    P Axis 34 degrees    R Axis -68 degrees    T Axis 86 degrees    Diagnosis       Sinus tachycardiaVentricular pre-excitation, WPW pattern type BAbnormal ECGWhen compared with ECG of 25-APR-2020 11:34,Sinus rhythm has replaced Electronic ventricular pacemaker         RADIOLOGY:  All x-ray studies areviewed/reviewed by me. Formal interpretations per the radiologist are as follows:      Ct Abdomen Pelvis W Iv Contrast Additional Contrast? None    Result Date: 7/17/2020  EXAMINATION: CT OF THE ABDOMEN AND PELVIS WITH CONTRAST 7/17/2020 1:46 pm TECHNIQUE: CT of the abdomen and pelvis was performed with the administration of intravenous contrast. Multiplanar reformatted images are provided for review. Dose modulation, iterative reconstruction, and/or weight based adjustment of the mA/kV was utilized to reduce the radiation dose to as low as reasonably achievable.  COMPARISON: 04/25/2020 HISTORY: ORDERING SYSTEM PROVIDED HISTORY: abd pain, N/V/D TECHNOLOGIST PROVIDED HISTORY: Reason for exam:->abd pain, N/V/D Additional Contrast?->None Reason for Exam: Abdominal pain starting around 6am this morning, N/V/D Acuity: Acute Type of Exam: Initial Relevant Medical/Surgical History: None FINDINGS: Lower Chest: There is no consolidation or effusion. Moderate dilatation of the azygos and pebbles azygous veins is again noted. Organs: The liver exhibits diffuse fatty infiltration. GI/Bowel: The appendix is normal.  There is no bowel dilatation, wall thickening or obstruction. Pelvis: The bladder and prostate are unremarkable. Peritoneum/Retroperitoneum: There is no free air, free fluid or intraperitoneal inflammatory change. There is no adenopathy. Bones/Soft Tissues: There is no fracture or aggressive osseous lesion. No acute abdominopelvic abnormality. Xr Chest Portable    Result Date: 7/17/2020  EXAMINATION: ONE XRAY VIEW OF THE CHEST 7/17/2020 12:32 pm COMPARISON: 04/25/2020 HISTORY: ORDERING SYSTEM PROVIDED HISTORY: chest pain TECHNOLOGIST PROVIDED HISTORY: Reason for exam:->chest pain Reason for Exam: chest pain Acuity: Acute Type of Exam: Initial FINDINGS: The lungs are clear. The cardiac silhouette is stable status post dual lead pacemaker. There is no pneumothorax or pleural effusion. 1.  No acute abnormality. EKG: The Ekg interpreted by me in the absence of a cardiologist shows. normal pacemaker rhythm with a rate of 127    See also interpretation by Brant Moura DO.      PROCEDURES:   N/A    CRITICAL CARE TIME:       Due to the immediate potential for life-threatening deterioration due to persistent tachycardia with concerns for sepsis versus alcohol/benzodiazepine withdrawal versus hypovolemia/dehydration, I spent 34 minutes providing critical care. This time is excluding time spent performing procedures.       CONSULTS:  IP CONSULT TO HOSPITALIST  IP CONSULT dehydration/hypovolemia from his vomiting and diarrhea he is given 2 L of normal saline IV. Patient further tells me that he is on Klonopin. He ran out of this prematurely 2 days ago. Because of that he has been given multiple doses of Ativan IV in the ED. Still, patient continues to be tachycardic. EKG again does show that he is in a paced rhythm but rate is 127. CBC reveals a white count of 12.2, H&H 17.7 and 50.7  CMP with mild hyponatremia 135 and chloride 94. Glucose 160. BUN 16 creatinine 1.0 with GFR greater than 60. Mild transaminitis with ALT in 194 and   Lipase normal at 18  Troponin <0.01  D-dimer <200  Ethanol level 15  Drug screen negative  Urinalysis unremarkable  Portable chest x-ray normal  CT abdomen and pelvis with IV contrast normal  Patient has continued to be tachycardic despite IV fluids, antiemetics and benzodiazepines. I suspect a degree of benzo withdrawal and potentially alcoholism/alcohol withdrawal along with hypovolemia from acute nausea, vomiting and diarrhea. I spoke with Dr. Blake Blankenship, cardiology. He recommended continued observation and repeat troponin. They will consult on him in-house. I will consult the hospitalist for admission as this patient requires further monitoring, resolution of tachycardia and observation for withdrawal from benzos and alcohol. I spoke with Dr. Gilles Leblanc. We thoroughly discussed the history, physical exam, laboratory and imaging studies, as well as, emergency department course.  Based upon that discussion, we've decided to admit Alejo Kc for further observation and evaluation of Dorcus Martha persistent tachycardia with nausea, vomiting and diarrhea this morning along with questionable benzodiazepine and alcohol use/withdrawal.  As I have deemed necessary from their history, physical and studies, I have considered and evaluated Alejo Kc for the following diagnoses:  SEPSIS, ACS, ACUTE APPENDICITIS, CHOLECYSTITIS, DIVERTICULITIS, PANCREATITIS, PYELONEPHRITIS, BOWEL OBSTRUCTION, INCARCERATED HERNIA, ISCHEMIC GUT, GI BLEED, PERFORATED BOWEL or ULCER. FINAL IMPRESSION:      1. Nausea vomiting and diarrhea    2. Chest pain, unspecified type    3. Tachycardia    4. Benzodiazepine withdrawal with complication ([address]    5. Transaminitis    6.  Hypovolemia          DISPOSITION/PLAN:   DISPOSITION     ADMIT             (Please note thatportions of this note were completed with a voice recognition program.  Efforts were made to edit the dictations, but occasionally words are mis-transcribed.)    Mitchell Krause PA-C (electronicallysigned)              Phu Bauer, [id number]  07/17/20 5077

## 2020-07-17 NOTE — ED NOTES
Pt presents to the ED from home ambulatory with c/o a sudden onset of mid chest pain 8/10 and emesis. Pt a/o x4, answering questions appropriately. Denies any son or fever/chills. Pt diaphoretic and flushed. Pt reports a long hx of anxiety and he is out of his klonopin, also states his md has changed his depression medications recently. Pt denies any hi/si.            Mariely Godinez RN  07/17/20 3007

## 2020-07-17 NOTE — PROGRESS NOTES
Bedside report received from [facility] in [facility]. Pt transported in stable condition via bed to [facility]. A&O, talkative and pleasant. Elevated /103, Dr Jayda Erickson at bedside & aware. All other VSS. C/o heartburn pain; requested order for Tums. Denies N/V at present; BS active x4; passing flatus; educated on clear liquid diet. Denies dyspnea. Avasys initiated for withdrawal precautions; seizure pads in place; pt educated on safety measures; expressed understanding. Informed pt on need for stool sample; hat placed in toilet; CDiff precautions initiated. Tele in place. Call light within reach. Bed side table within reach. Wheels locked. Bed in lowest position. Bed check in place. Pt instructed to call out for assistance. Pt expressed understanding & calls out appropriately. Admission charting began. All care cont per orders. Will cont to monitor.  Electronically signed by Quyen Treviño RN on 7/17/2020 at 7:39 PM

## 2020-07-17 NOTE — ED PROVIDER NOTES
I independently performed a history and physical on Cricket Singh. All diagnostic, treatment, and disposition decisions were made by myself in conjunction with the mid-level provider. For further details of Kem Posadas emergency department encounter, please see Kinsey President, PA's documentation. Patient presents to the emergency department complaining of nausea, vomiting, and upper abdominal/lower chest pain. Patient reports that he drank approximately 6 beers last night and had been doing well until 6 AM this morning at which point he reports that he began vomiting. Patient has had ongoing vomiting since that time. No fevers, chills, or sweats. No hemoptysis, hematemesis,, melena, or hematochezia. Abdominal pain is rated as 8/10. Of note, patient also ran out of his prescribed Klonopin 2 days ago and has not been taking this medication. Physical exam: General: No acute distress. Heart: Tachycardic. Normal S1-S2. Lungs: Clear to auscultation bilaterally. No wheezes, rales, rhonchi. Abdomen epigastric tenderness to palpation. No rebound, or guarding. EKG: Paced rhythm with a rate of 127. Normal axis. No change compared to previous EKG on 4/15/2020    Pacer interrogated. Rechecks:  1205: Pulse 125.  1411: Pulse 116.  1754: Pulse 108. Patient presents to the emergency department complaining of nausea, vomiting, and abdominal pain. Patient's labs appear stable without evidence of pancreatitis, or ACS. I suspect patient's symptoms are likely result of multiple factors including alcohol intoxication last night with resultant hypovolemia that was exacerbated by his nausea, vomiting, and diarrhea. Concurrently, patient also had reported alcoholism and admits to drinking at least 6 beers last night. Additionally, he ran out of his benzodiazepine prescription within the last 2 days.   I suspect that he is either having benzodiazepine withdrawal, or alcohol withdrawal.    Critical care: Critical care of 35 minutes was completed on patient in addition to and excluding any procedures noted. Assessment/plan:   1. Nausea vomiting and diarrhea    2. Chest pain, unspecified type    3. Tachycardia    4. Benzodiazepine withdrawal with complication ([facility]    5. Transaminitis    6.  Hypovolemia               Esther Gonzáles,   07/17/20 2042       Esther Gonzáles,   07/17/20 2047

## 2020-07-17 NOTE — H&P
Hospital Medicine History & Physical      PCP: Fidel Herrera    Date of Admission: 7/17/2020    Date of Service: Pt seen/examined on 7/17/2020 and Admitted to Inpatient with expected LOS greater than two midnights due to medical therapy. Chief Complaint: Nausea vomiting diarrhea for 1 day      History Of Present Illness:     36 y.o. male who presented to [facility] with above complaint. He denies any traveling or eating outside. He vomited 8 times and had couple of loose stools. He denies hematemesis melena or OK bleed. No abdominal distention. His abdominal pain is more in the epigastrium and the left side. It was 8 in intensity more with the vomiting. He experienced some chest pain after vomiting as well. Currently he does not have any abdominal pain no chest pain. He thinks he can take something orally at this time. No fever cough trouble breathing no change in mental status. No focal neurological symptoms. Denies contact history of COVID. Past Medical History:          Diagnosis Date    Depression        Past Surgical History:          Procedure Laterality Date    PACEMAKER INSERTION         Medications Prior to Admission:      Prior to Admission medications    Medication Sig Start Date End Date Taking? Authorizing Provider   dicyclomine (BENTYL) 10 MG capsule Take 1 capsule by mouth every 6 hours as needed (cramps) 4/25/20   Kareen Jenkins MD   citalopram (CELEXA) 40 MG tablet Take 40 mg by mouth daily    Historical Provider, MD   clonazePAM (KLONOPIN) 0.5 MG tablet Take 0.5 mg by mouth 2 times daily as needed. Historical Provider, MD       Allergies:  Patient has no known allergies. Social History:      The patient currently lives at home    TOBACCO:   reports that he has quit smoking. He quit after 5.00 years of use. He has never used smokeless tobacco.  ETOH:   reports current alcohol use.   E-Cigarettes Vaping or Juuling     Questions Responses    Vaping Use     Start Date     Does device contain nicotine? Quit Date     Vaping Type             Family History:       Reviewed in detail and negative for DM, CAD, Cancer, CVA. Positive as follows:    No family history on file. REVIEW OF SYSTEMS:   Pertinent positives as noted in the HPI. All other systems reviewed and negative. PHYSICAL EXAM PERFORMED:    BP (!) 125/105   Pulse 127   Temp 98.7 °F (37.1 °C) (Oral)   Resp 27   Ht 5' 9\" (1.753 m)   Wt 212 lb (96.2 kg)   SpO2 98%   BMI 31.31 kg/m²     General appearance:  No apparent distress, appears stated age and cooperative. HEENT:  Normal cephalic, atraumatic without obvious deformity. Pupils equal, round, and reactive to light. Extra ocular muscles intact. Conjunctivae/corneas clear. Neck: Supple, with full range of motion. No jugular venous distention. Trachea midline. Respiratory:  Normal respiratory effort. Clear to auscultation, bilaterally without Rales/Wheezes/Rhonchi. Cardiovascular:  Regular rate and rhythm with normal S1/S2 without murmurs, rubs or gallops. Has pacemaker  Abdomen: Soft, mildly-tender, non-distended with normal bowel sounds. Musculoskeletal:  No clubbing, cyanosis or edema bilaterally. Full range of motion without deformity. Skin: Skin color, texture, turgor normal.  No rashes or lesions. Neurologic:  Neurovascularly intact without any focal sensory/motor deficits. Cranial nerves: II-XII intact, grossly non-focal.  Psychiatric:  Alert and oriented, thought content appropriate, normal insight  Capillary Refill: Brisk,< 3 seconds   Peripheral Pulses: +2 palpable, equal bilaterally       Labs:     Recent Labs     07/17/20  1218   WBC 12.2*   HGB 17.7*   HCT 50.7        Recent Labs     07/17/20  1218   *   K 3.9   CL 94*   CO2 21   BUN 6*   CREATININE 1.0   CALCIUM 10.3     Recent Labs     07/17/20  1218   *   *   BILITOT 0.7   ALKPHOS 97     No results for input(s): INR in the last 72 hours.   Recent Labs discussed with RN  Code Status: Full code    PT/OT Eval Status:     Dispo -admitted to Ronan Acosta MD    Thank you Tom Lara for the opportunity to be involved in this patient's care. If you have any questions or concerns please feel free to contact me at [phone].

## 2020-07-17 NOTE — ED NOTES
1616 - PS to hospitalists  Re:  CP, N/V/D, tachycardia, benzo withdrawal  1636 - Dr Izabel Louie called back to speak with CHRIS Deluca  07/17/20 1950

## 2020-07-17 NOTE — ED NOTES
56 - called [facility] per consult  Re: persistent tachycardia, pacemaker in place  1615 - Dr Blake Blankenship called back to speak with CHRIS Deluca  07/17/20 1610

## 2020-07-18 LAB
ALBUMIN SERPL-MCNC: 4.1 G/DL (ref 3.4–5)
ALP BLD-CCNC: 79 U/L (ref 40–129)
ALT SERPL-CCNC: 144 U/L (ref 10–40)
ANION GAP SERPL CALCULATED.3IONS-SCNC: 10 MMOL/L (ref 3–16)
AST SERPL-CCNC: 91 U/L (ref 15–37)
BASOPHILS ABSOLUTE: 0.1 K/UL (ref 0–0.2)
BASOPHILS RELATIVE PERCENT: 1.9 %
BILIRUB SERPL-MCNC: 1.2 MG/DL (ref 0–1)
BILIRUBIN DIRECT: 0.4 MG/DL (ref 0–0.3)
BILIRUBIN, INDIRECT: 0.8 MG/DL (ref 0–1)
BUN BLDV-MCNC: 8 MG/DL (ref 7–20)
CALCIUM SERPL-MCNC: 9 MG/DL (ref 8.3–10.6)
CHLORIDE BLD-SCNC: 102 MMOL/L (ref 99–110)
CO2: 25 MMOL/L (ref 21–32)
CREAT SERPL-MCNC: 1.1 MG/DL (ref 0.9–1.3)
EOSINOPHILS ABSOLUTE: 0.2 K/UL (ref 0–0.6)
EOSINOPHILS RELATIVE PERCENT: 2.8 %
GFR AFRICAN AMERICAN: >60
GFR NON-AFRICAN AMERICAN: >60
GLUCOSE BLD-MCNC: 123 MG/DL (ref 70–99)
HAV IGM SER IA-ACNC: NORMAL
HCT VFR BLD CALC: 43.5 % (ref 40.5–52.5)
HEMOGLOBIN: 14.8 G/DL (ref 13.5–17.5)
HEPATITIS B CORE IGM ANTIBODY: NORMAL
HEPATITIS B SURFACE ANTIGEN INTERPRETATION: NORMAL
HEPATITIS C ANTIBODY INTERPRETATION: NORMAL
LYMPHOCYTES ABSOLUTE: 1.8 K/UL (ref 1–5.1)
LYMPHOCYTES RELATIVE PERCENT: 27 %
MCH RBC QN AUTO: 31.9 PG (ref 26–34)
MCHC RBC AUTO-ENTMCNC: 34 G/DL (ref 31–36)
MCV RBC AUTO: 94 FL (ref 80–100)
MONOCYTES ABSOLUTE: 0.5 K/UL (ref 0–1.3)
MONOCYTES RELATIVE PERCENT: 7.3 %
NEUTROPHILS ABSOLUTE: 4.1 K/UL (ref 1.7–7.7)
NEUTROPHILS RELATIVE PERCENT: 61 %
PDW BLD-RTO: 13.2 % (ref 12.4–15.4)
PLATELET # BLD: 272 K/UL (ref 135–450)
PMV BLD AUTO: 8 FL (ref 5–10.5)
POTASSIUM REFLEX MAGNESIUM: 3.8 MMOL/L (ref 3.5–5.1)
RBC # BLD: 4.63 M/UL (ref 4.2–5.9)
SODIUM BLD-SCNC: 137 MMOL/L (ref 136–145)
TOTAL PROTEIN: 6.5 G/DL (ref 6.4–8.2)
WBC # BLD: 6.7 K/UL (ref 4–11)

## 2020-07-18 PROCEDURE — 80074 ACUTE HEPATITIS PANEL: CPT

## 2020-07-18 PROCEDURE — 1200000000 HC SEMI PRIVATE

## 2020-07-18 PROCEDURE — 6370000000 HC RX 637 (ALT 250 FOR IP): Performed by: HOSPITALIST

## 2020-07-18 PROCEDURE — 80076 HEPATIC FUNCTION PANEL: CPT

## 2020-07-18 PROCEDURE — 6360000002 HC RX W HCPCS: Performed by: INTERNAL MEDICINE

## 2020-07-18 PROCEDURE — 36415 COLL VENOUS BLD VENIPUNCTURE: CPT

## 2020-07-18 PROCEDURE — 80048 BASIC METABOLIC PNL TOTAL CA: CPT

## 2020-07-18 PROCEDURE — C9113 INJ PANTOPRAZOLE SODIUM, VIA: HCPCS | Performed by: INTERNAL MEDICINE

## 2020-07-18 PROCEDURE — 6370000000 HC RX 637 (ALT 250 FOR IP): Performed by: INTERNAL MEDICINE

## 2020-07-18 PROCEDURE — 2580000003 HC RX 258: Performed by: INTERNAL MEDICINE

## 2020-07-18 PROCEDURE — 85025 COMPLETE CBC W/AUTO DIFF WBC: CPT

## 2020-07-18 RX ORDER — LOPERAMIDE HYDROCHLORIDE 2 MG/1
2 CAPSULE ORAL 4 TIMES DAILY PRN
Status: DISCONTINUED | OUTPATIENT
Start: 2020-07-18 | End: 2020-07-19 | Stop reason: HOSPADM

## 2020-07-18 RX ADMIN — CLONAZEPAM 0.5 MG: 0.5 TABLET ORAL at 17:30

## 2020-07-18 RX ADMIN — LOPERAMIDE HYDROCHLORIDE 2 MG: 2 CAPSULE ORAL at 17:30

## 2020-07-18 RX ADMIN — CITALOPRAM HYDROBROMIDE 40 MG: 20 TABLET ORAL at 10:37

## 2020-07-18 RX ADMIN — ENOXAPARIN SODIUM 40 MG: 40 INJECTION SUBCUTANEOUS at 10:37

## 2020-07-18 RX ADMIN — CLONAZEPAM 0.5 MG: 0.5 TABLET ORAL at 05:00

## 2020-07-18 RX ADMIN — Medication 10 ML: at 10:38

## 2020-07-18 RX ADMIN — DICYCLOMINE HYDROCHLORIDE 10 MG: 10 CAPSULE ORAL at 05:00

## 2020-07-18 RX ADMIN — PANTOPRAZOLE SODIUM 40 MG: 40 INJECTION, POWDER, FOR SOLUTION INTRAVENOUS at 10:36

## 2020-07-18 RX ADMIN — SODIUM CHLORIDE: 9 INJECTION, SOLUTION INTRAVENOUS at 17:30

## 2020-07-18 RX ADMIN — SODIUM CHLORIDE: 9 INJECTION, SOLUTION INTRAVENOUS at 10:36

## 2020-07-18 NOTE — PROGRESS NOTES
intact without any focal sensory/motor deficits. Cranial nerves: II-XII intact, grossly non-focal.  Psychiatric: Alert and oriented, thought content appropriate, normal insight  Capillary Refill: Brisk,< 3 seconds   Peripheral Pulses: +2 palpable, equal bilaterally       Labs:   Recent Labs     07/17/20  1218 07/18/20  0521   WBC 12.2* 6.7   HGB 17.7* 14.8   HCT 50.7 43.5    272     Recent Labs     07/17/20  1218 07/18/20  0521   * 137   K 3.9 3.8   CL 94* 102   CO2 21 25   BUN 6* 8   CREATININE 1.0 1.1   CALCIUM 10.3 9.0     Recent Labs     07/17/20  1218   *   *   BILITOT 0.7   ALKPHOS 97     No results for input(s): INR in the last 72 hours. Recent Labs     07/17/20  1218 07/17/20  1637   TROPONINI <0.01 <0.01       Urinalysis:      Lab Results   Component Value Date    NITRU Negative 07/17/2020    WBCUA None seen 07/17/2020    RBCUA 0-2 07/17/2020    BLOODU TRACE-INTACT 07/17/2020    SPECGRAV 1.025 07/17/2020    GLUCOSEU Negative 07/17/2020       Radiology:  CT ABDOMEN PELVIS W IV CONTRAST Additional Contrast? None   Final Result   No acute abdominopelvic abnormality. XR CHEST PORTABLE   Final Result   1. No acute abnormality. Assessment/Plan:    Active Hospital Problems    Diagnosis    Nausea & vomiting [R11.2]     1. This is a 61-year-old male admitted with a gastroenteritis C. difficile toxin negative continue with the supportive care advance diet as tolerated. 2.  History of depression anxiety continue with home medication. 3.  Elevated liver function tests history of alcohol abuse hepatitis panel pending repeat LFT in a.m.  4.  Leukocytosis will follow.     DVT Prophylaxis: Lovenox subcu  Diet: DIET CLEAR LIQUID;  Code Status: Full Code    PT/OT Eval Status:     Modesta Prabhakar MD

## 2020-07-18 NOTE — PROGRESS NOTES
[facility] Avasys in place. A&OX4. Independent in transfer. Frequent BMs/ diarrhea, prm imodium given, see MAR. PRN klonopin given, see MAR for anxiety. Assessment completed and charted. Bed locked and in lowest position. Call light within reach. Pt denies any other needs at this time. Will continue to monitor.

## 2020-07-18 NOTE — PLAN OF CARE
Problem: Diarrhea:  Goal: Establishment of normal bowel function will improve to within specified parameters  Description: Establishment of normal bowel function will improve to within specified parameters  Outcome: Ongoing     Pt reports that he has had 9 episodes of diarrhea yesterday and one this morning. Dr. Tanner Remedies notified. Orders received for Immodium.

## 2020-07-18 NOTE — FLOWSHEET NOTE
Pt A&Ox4. VSS. Assessment completed. Pt denies pain or other needs at this time. Seizure pads on and AVASYS in place. Call light within reach, bed in lowest position, wheels locked. Will monitor.

## 2020-07-18 NOTE — FLOWSHEET NOTE
4 Eyes Skin Assessment     The patient is being assess for  Admission    I agree that 2 RN's have performed a thorough Head to Toe Skin Assessment on the patient. ALL assessment sites listed below have been assessed. Areas assessed by both nurses:   [x]   Head, Face, and Ears   [x]   Shoulders, Back, and Chest  [x]   Arms, Elbows, and Hands   [x]   Coccyx, Sacrum, and IschIum  [x]   Legs, Feet, and Heels        Does the Patient have Skin Breakdown?   No         Nicola Prevention initiated:  No   Wound Care Orders initiated:  No      [facility] nurse consulted for Pressure Injury (Stage 3,4, Unstageable, DTI, NWPT, and Complex wounds), New and Established Ostomies:  No      Nurse 1 eSignature: Electronically signed by Washington Zarate RN on 7/17/20 at 10:01 PM EDT    **SHARE this note so that the co-signing nurse is able to place an eSignature**    Nurse 2 eSignature: Electronically signed by Yolie Manriquez RN on 7/18/20 at 7:20 AM EDT

## 2020-07-18 NOTE — PROGRESS NOTES
Dr. Acosta Klein from cardiology called this writer questioning whether hospitalist still wanted cardiology to see patient since his tachycardia has resolved. This writer paged Dr. Javier Bartholomew. Waiting on response.

## 2020-07-18 NOTE — PROGRESS NOTES
Pt is alert and oriented x 4. Vitals signs are stable. Assessment is as charted. Bowel sounds are active. Pt denies cramping. Pt denies further needs at this time. Will continue to monitor.

## 2020-07-19 VITALS
OXYGEN SATURATION: 96 % | TEMPERATURE: 98.4 F | HEART RATE: 90 BPM | SYSTOLIC BLOOD PRESSURE: 138 MMHG | DIASTOLIC BLOOD PRESSURE: 92 MMHG | WEIGHT: 212 LBS | BODY MASS INDEX: 31.4 KG/M2 | RESPIRATION RATE: 16 BRPM | HEIGHT: 69 IN

## 2020-07-19 LAB
ALBUMIN SERPL-MCNC: 4.2 G/DL (ref 3.4–5)
ALP BLD-CCNC: 84 U/L (ref 40–129)
ALT SERPL-CCNC: 127 U/L (ref 10–40)
ANION GAP SERPL CALCULATED.3IONS-SCNC: 11 MMOL/L (ref 3–16)
AST SERPL-CCNC: 70 U/L (ref 15–37)
BILIRUB SERPL-MCNC: 1 MG/DL (ref 0–1)
BILIRUBIN DIRECT: 0.3 MG/DL (ref 0–0.3)
BILIRUBIN, INDIRECT: 0.7 MG/DL (ref 0–1)
BUN BLDV-MCNC: 7 MG/DL (ref 7–20)
CALCIUM SERPL-MCNC: 9.2 MG/DL (ref 8.3–10.6)
CHLORIDE BLD-SCNC: 101 MMOL/L (ref 99–110)
CO2: 28 MMOL/L (ref 21–32)
CREAT SERPL-MCNC: 1.1 MG/DL (ref 0.9–1.3)
GFR AFRICAN AMERICAN: >60
GFR NON-AFRICAN AMERICAN: >60
GLUCOSE BLD-MCNC: 102 MG/DL (ref 70–99)
HCT VFR BLD CALC: 42.3 % (ref 40.5–52.5)
HEMOGLOBIN: 14.5 G/DL (ref 13.5–17.5)
MCH RBC QN AUTO: 32.4 PG (ref 26–34)
MCHC RBC AUTO-ENTMCNC: 34.3 G/DL (ref 31–36)
MCV RBC AUTO: 94.5 FL (ref 80–100)
PDW BLD-RTO: 13.3 % (ref 12.4–15.4)
PLATELET # BLD: 242 K/UL (ref 135–450)
PMV BLD AUTO: 8 FL (ref 5–10.5)
POTASSIUM SERPL-SCNC: 3.7 MMOL/L (ref 3.5–5.1)
RBC # BLD: 4.48 M/UL (ref 4.2–5.9)
SODIUM BLD-SCNC: 140 MMOL/L (ref 136–145)
TOTAL PROTEIN: 6.5 G/DL (ref 6.4–8.2)
WBC # BLD: 6.8 K/UL (ref 4–11)

## 2020-07-19 PROCEDURE — 6370000000 HC RX 637 (ALT 250 FOR IP): Performed by: HOSPITALIST

## 2020-07-19 PROCEDURE — C9113 INJ PANTOPRAZOLE SODIUM, VIA: HCPCS | Performed by: INTERNAL MEDICINE

## 2020-07-19 PROCEDURE — 2580000003 HC RX 258: Performed by: INTERNAL MEDICINE

## 2020-07-19 PROCEDURE — 6360000002 HC RX W HCPCS: Performed by: INTERNAL MEDICINE

## 2020-07-19 PROCEDURE — 80076 HEPATIC FUNCTION PANEL: CPT

## 2020-07-19 PROCEDURE — 85027 COMPLETE CBC AUTOMATED: CPT

## 2020-07-19 PROCEDURE — 80048 BASIC METABOLIC PNL TOTAL CA: CPT

## 2020-07-19 PROCEDURE — 6370000000 HC RX 637 (ALT 250 FOR IP): Performed by: INTERNAL MEDICINE

## 2020-07-19 RX ADMIN — Medication 10 ML: at 09:20

## 2020-07-19 RX ADMIN — CLONAZEPAM 0.5 MG: 0.5 TABLET ORAL at 06:14

## 2020-07-19 RX ADMIN — PANTOPRAZOLE SODIUM 40 MG: 40 INJECTION, POWDER, FOR SOLUTION INTRAVENOUS at 09:20

## 2020-07-19 RX ADMIN — LOPERAMIDE HYDROCHLORIDE 2 MG: 2 CAPSULE ORAL at 10:14

## 2020-07-19 RX ADMIN — CITALOPRAM HYDROBROMIDE 40 MG: 20 TABLET ORAL at 09:20

## 2020-07-19 RX ADMIN — ENOXAPARIN SODIUM 40 MG: 40 INJECTION SUBCUTANEOUS at 09:20

## 2020-07-19 NOTE — PROGRESS NOTES
Hospitalist Progress Note      PCP: Carlos Peter    Date of Admission: 7/17/2020    Chief Complaint: Nausea vomiting and diarrhea    Hospital Course: This is a 27-year-old male admitted with a nausea vomiting and diarrhea C. difficile toxin negative    Subjective: Patient denies any chest pain or shortness of breath no nausea vomiting no diarrhea yesterday he had a 4 BMs soft today 1 this morning soft tolerating full liquid. Patient denies any melena no hematochezia family history of Crohn's disease his mother. Medications:  Reviewed    Infusion Medications    sodium chloride 50 mL/hr at 07/18/20 1730     Scheduled Medications    citalopram  40 mg Oral Daily    sodium chloride flush  10 mL Intravenous 2 times per day    enoxaparin  40 mg Subcutaneous Daily    pantoprazole  40 mg Intravenous Daily     PRN Meds: loperamide, clonazePAM, dicyclomine, sodium chloride flush, acetaminophen **OR** acetaminophen, polyethylene glycol, promethazine **OR** ondansetron, calcium carbonate      Intake/Output Summary (Last 24 hours) at 7/19/2020 0847  Last data filed at 7/19/2020 0332  Gross per 24 hour   Intake 2631 ml   Output 925 ml   Net 1706 ml       Physical Exam Performed:    BP (!) 146/97   Pulse 88   Temp 97.9 °F (36.6 °C) (Oral)   Resp 16   Ht 5' 9\" (1.753 m)   Wt 212 lb (96.2 kg)   SpO2 98%   BMI 31.31 kg/m²     General appearance: No apparent distress, appears stated age and cooperative. HEENT: Pupils equal, round, and reactive to light. Conjunctivae/corneas clear. Neck: Supple, with full range of motion. No jugular venous distention. Trachea midline. Respiratory:  Normal respiratory effort. Clear to auscultation, bilaterally without Rales/Wheezes/Rhonchi. Cardiovascular: Regular rate and rhythm with normal S1/S2 without murmurs, rubs or gallops. Abdomen: Soft, non-tender, non-distended with normal bowel sounds. Musculoskeletal: No clubbing, cyanosis or edema bilaterally.   Full range of motion without deformity. Skin: Skin color, texture, turgor normal.  No rashes or lesions. Neurologic:  Neurovascularly intact without any focal sensory/motor deficits. Cranial nerves: II-XII intact, grossly non-focal.  Psychiatric: Alert and oriented, thought content appropriate, normal insight  Capillary Refill: Brisk,< 3 seconds   Peripheral Pulses: +2 palpable, equal bilaterally       Labs:   Recent Labs     07/17/20  1218 07/18/20  0521 07/19/20  0532   WBC 12.2* 6.7 6.8   HGB 17.7* 14.8 14.5   HCT 50.7 43.5 42.3    272 242     Recent Labs     07/17/20  1218 07/18/20  0521 07/19/20  0532   * 137 140   K 3.9 3.8 3.7   CL 94* 102 101   CO2 21 25 28   BUN 6* 8 7   CREATININE 1.0 1.1 1.1   CALCIUM 10.3 9.0 9.2     Recent Labs     07/17/20  1218 07/18/20  0521   * 91*   * 144*   BILIDIR  --  0.4*   BILITOT 0.7 1.2*   ALKPHOS 97 79     No results for input(s): INR in the last 72 hours. Recent Labs     07/17/20  1218 07/17/20  1637   TROPONINI <0.01 <0.01       Urinalysis:      Lab Results   Component Value Date    NITRU Negative 07/17/2020    WBCUA None seen 07/17/2020    RBCUA 0-2 07/17/2020    BLOODU TRACE-INTACT 07/17/2020    SPECGRAV 1.025 07/17/2020    GLUCOSEU Negative 07/17/2020       Radiology:  CT ABDOMEN PELVIS W IV CONTRAST Additional Contrast? None   Final Result   No acute abdominopelvic abnormality. XR CHEST PORTABLE   Final Result   1. No acute abnormality. Assessment/Plan:    Active Hospital Problems    Diagnosis    Nausea & vomiting [R11.2]     1. This is a 21-year-old male admitted with gastroenteritis C. difficile toxin negative continue with the supportive care will advance diet to regular diet if tolerates discharge home later today to follow-up with PCP within a week. 2.  History of depression anxiety continue with home medication on Celexa and Klonopin.   3.  Elevated liver function tests history of alcohol abuse hepatitis panel pending repeat LFT continues to improve will add LFT to today's lab. 4.  Leukocytosis improved.     DVT Prophylaxis: Lovenox subcu  Diet: DIET FULL LIQUID;  Code Status: Full Code    PT/OT Eval Status:     Katherin Carlos MD

## 2020-07-19 NOTE — PROGRESS NOTES
[facility] check @ [facility]- 7/17/20. Managing EP:[place] [id number] [facility]   [address]   [address] [place] [address]   [phone]    Pt c/o chest pain. Additional Notes:  Reason for check: Chest pain, tachycardia noted with pacemaker. Patient name: Hawa Quinn  Present rhythm: atrial sensed and ventricular paced at 115 bpm, device is programmed to track to  160bpm.  Since data last cleared on 05-Sep-2019:  Available daily battery/lead measurements within expected range. Lead trends stable. No arrhythmias/high rate episodes detected since last interrogation. Device appears to be currently functioning as programmed. See PACEART report under Cardiology tab.

## 2020-07-19 NOTE — DISCHARGE SUMMARY
bilaterally. Full range of motion without deformity. Skin: Skin color, texture, turgor normal.  No rashes or lesions. Neurologic:  Neurovascularly intact without any focal sensory/motor deficits. Cranial nerves: II-XII intact, grossly non-focal.  Psychiatric:  Alert and oriented, thought content appropriate, normal insight  Capillary Refill: Brisk,< 3 seconds   Peripheral Pulses: +2 palpable, equal bilaterally       Labs: For convenience and continuity at follow-up the following most recent labs are provided:      CBC:    Lab Results   Component Value Date    WBC 6.8 07/19/2020    HGB 14.5 07/19/2020    HCT 42.3 07/19/2020     07/19/2020       Renal:    Lab Results   Component Value Date     07/19/2020    K 3.7 07/19/2020    K 3.8 07/18/2020     07/19/2020    CO2 28 07/19/2020    BUN 7 07/19/2020    CREATININE 1.1 07/19/2020    CALCIUM 9.2 07/19/2020    PHOS 3.2 05/22/2020         Significant Diagnostic Studies    Radiology:   CT ABDOMEN PELVIS W IV CONTRAST Additional Contrast? None   Final Result   No acute abdominopelvic abnormality. XR CHEST PORTABLE   Final Result   1. No acute abnormality. Consults:     IP CONSULT TO HOSPITALIST    Disposition: Home    Condition at Discharge: Stable    Discharge Instructions/Follow-up: Follow-up with PCP within a week    Code Status: Full code    Activity: activity as tolerated    Diet: General      Discharge Medications:     Discharge Medication List as of 7/19/2020  1:24 PM           Details   dicyclomine (BENTYL) 10 MG capsule Take 1 capsule by mouth every 6 hours as needed (cramps), Disp-20 capsule, R-0Print      citalopram (CELEXA) 40 MG tablet Take 40 mg by mouth dailyHistorical Med      clonazePAM (KLONOPIN) 0.5 MG tablet Take 0.5 mg by mouth 2 times daily as needed. Historical Med             Time Spent on discharge is more than 35 minutes in the examination, evaluation, counseling and review of medications and discharge plan.      Signed:    Jim Giles MD   7/19/2020      Thank you Ishmael Ro for the opportunity to be involved in this patient's care. If you have any questions or concerns please feel free to contact me at [phone].

## 2020-07-19 NOTE — PROGRESS NOTES
Patient tolerated general diet. BP elevated 148/108, patient asymptomatic. Notified Dr. Noris Toney, waiting on callback.

## 2020-07-19 NOTE — PROGRESS NOTES
Shift assessment completed and charted. VSS. Avasys in place. A&OX4. Independent in transfer. Per pt tolerating fulls w/o increase pain or nausea noted, pt denies at this time. Bed locked and in lowest position. Call light within reach. Pt denies any other needs at this time. Will continue to monitor.

## 2020-07-19 NOTE — PROGRESS NOTES
/92. Tele/IV removed. Dr. Phipps Medico aware patient tolerated general diet. Patient waiting on ride.

## 2020-07-19 NOTE — PROGRESS NOTES
Shift assessment updated as charted. Patient a/ox4. VSS. Patient tolerating full liquid diet. Denies needs at this time. Will monitor.

## 2020-07-20 ENCOUNTER — NURSE ONLY (OUTPATIENT)
Dept: CARDIOLOGY CLINIC | Age: 35
End: 2020-07-20
Payer: COMMERCIAL

## 2020-07-20 LAB — TSH REFLEX: 2.24 UIU/ML (ref 0.27–4.2)

## 2020-07-25 ENCOUNTER — APPOINTMENT (OUTPATIENT)
Dept: CT IMAGING | Age: 35
End: 2020-07-25

## 2020-07-25 ENCOUNTER — HOSPITAL ENCOUNTER (EMERGENCY)
Age: 35
Discharge: HOME | End: 2020-07-25
Attending: EMERGENCY MEDICINE
Payer: COMMERCIAL

## 2020-07-25 ENCOUNTER — APPOINTMENT (OUTPATIENT)
Dept: GENERAL RADIOLOGY | Age: 35
End: 2020-07-25
Payer: COMMERCIAL

## 2020-07-25 ENCOUNTER — APPOINTMENT (OUTPATIENT)
Dept: CT IMAGING | Age: 35
End: 2020-07-25
Payer: COMMERCIAL

## 2020-07-25 ENCOUNTER — APPOINTMENT (OUTPATIENT)
Dept: GENERAL RADIOLOGY | Age: 35
End: 2020-07-25

## 2020-07-25 VITALS
BODY MASS INDEX: 31.4 KG/M2 | HEART RATE: 108 BPM | TEMPERATURE: 98.6 F | RESPIRATION RATE: 16 BRPM | HEIGHT: 69 IN | SYSTOLIC BLOOD PRESSURE: 178 MMHG | WEIGHT: 212 LBS | OXYGEN SATURATION: 98 % | DIASTOLIC BLOOD PRESSURE: 100 MMHG

## 2020-07-25 VITALS
OXYGEN SATURATION: 95 % | RESPIRATION RATE: 14 BRPM | TEMPERATURE: 98 F | SYSTOLIC BLOOD PRESSURE: 107 MMHG | DIASTOLIC BLOOD PRESSURE: 79 MMHG | WEIGHT: 212 LBS | BODY MASS INDEX: 31.31 KG/M2 | HEART RATE: 81 BPM

## 2020-07-25 LAB
A/G RATIO: 1.8 (ref 1.1–2.2)
A/G RATIO: 2 (ref 1.1–2.2)
ALBUMIN SERPL-MCNC: 4.6 G/DL (ref 3.4–5)
ALBUMIN SERPL-MCNC: 5 G/DL (ref 3.4–5)
ALP BLD-CCNC: 101 U/L (ref 40–129)
ALP BLD-CCNC: 95 U/L (ref 40–129)
ALT SERPL-CCNC: 205 U/L (ref 10–40)
ALT SERPL-CCNC: 222 U/L (ref 10–40)
ANION GAP SERPL CALCULATED.3IONS-SCNC: 13 MMOL/L (ref 3–16)
ANION GAP SERPL CALCULATED.3IONS-SCNC: 13 MMOL/L (ref 3–16)
APTT: 30.8 SEC (ref 24.2–36.2)
AST SERPL-CCNC: 103 U/L (ref 15–37)
AST SERPL-CCNC: 98 U/L (ref 15–37)
BASOPHILS ABSOLUTE: 0.1 K/UL (ref 0–0.2)
BASOPHILS ABSOLUTE: 0.1 K/UL (ref 0–0.2)
BASOPHILS RELATIVE PERCENT: 0.7 %
BASOPHILS RELATIVE PERCENT: 1 %
BILIRUB SERPL-MCNC: 0.4 MG/DL (ref 0–1)
BILIRUB SERPL-MCNC: 0.4 MG/DL (ref 0–1)
BILIRUBIN URINE: NEGATIVE
BLOOD, URINE: NEGATIVE
BUN BLDV-MCNC: 5 MG/DL (ref 7–20)
BUN BLDV-MCNC: 6 MG/DL (ref 7–20)
CALCIUM SERPL-MCNC: 9.2 MG/DL (ref 8.3–10.6)
CALCIUM SERPL-MCNC: 9.8 MG/DL (ref 8.3–10.6)
CHLORIDE BLD-SCNC: 100 MMOL/L (ref 99–110)
CHLORIDE BLD-SCNC: 97 MMOL/L (ref 99–110)
CLARITY: CLEAR
CO2: 26 MMOL/L (ref 21–32)
CO2: 29 MMOL/L (ref 21–32)
COLOR: YELLOW
CREAT SERPL-MCNC: 1.1 MG/DL (ref 0.9–1.3)
CREAT SERPL-MCNC: 1.1 MG/DL (ref 0.9–1.3)
EKG ATRIAL RATE: 110 BPM
EKG DIAGNOSIS: NORMAL
EKG P AXIS: 52 DEGREES
EKG P-R INTERVAL: 144 MS
EKG Q-T INTERVAL: 392 MS
EKG QRS DURATION: 174 MS
EKG QTC CALCULATION (BAZETT): 530 MS
EKG R AXIS: -70 DEGREES
EKG T AXIS: 88 DEGREES
EKG VENTRICULAR RATE: 110 BPM
EOSINOPHILS ABSOLUTE: 0.1 K/UL (ref 0–0.6)
EOSINOPHILS ABSOLUTE: 0.2 K/UL (ref 0–0.6)
EOSINOPHILS RELATIVE PERCENT: 1.2 %
EOSINOPHILS RELATIVE PERCENT: 2.2 %
ETHANOL: 159 MG/DL (ref 0–0.08)
GFR AFRICAN AMERICAN: >60
GFR AFRICAN AMERICAN: >60
GFR NON-AFRICAN AMERICAN: >60
GFR NON-AFRICAN AMERICAN: >60
GLOBULIN: 2.5 G/DL
GLOBULIN: 2.6 G/DL
GLUCOSE BLD-MCNC: 155 MG/DL (ref 70–99)
GLUCOSE BLD-MCNC: 167 MG/DL (ref 70–99)
GLUCOSE BLD-MCNC: 171 MG/DL (ref 70–99)
GLUCOSE URINE: NEGATIVE MG/DL
HCT VFR BLD CALC: 45.2 % (ref 40.5–52.5)
HCT VFR BLD CALC: 49.6 % (ref 40.5–52.5)
HEMOGLOBIN: 15.5 G/DL (ref 13.5–17.5)
HEMOGLOBIN: 16.9 G/DL (ref 13.5–17.5)
INR BLD: 1.02 (ref 0.86–1.14)
KETONES, URINE: NEGATIVE MG/DL
LEUKOCYTE ESTERASE, URINE: NEGATIVE
LIPASE: 18 U/L (ref 13–60)
LYMPHOCYTES ABSOLUTE: 2 K/UL (ref 1–5.1)
LYMPHOCYTES ABSOLUTE: 3.3 K/UL (ref 1–5.1)
LYMPHOCYTES RELATIVE PERCENT: 29.3 %
LYMPHOCYTES RELATIVE PERCENT: 46.8 %
MCH RBC QN AUTO: 31.9 PG (ref 26–34)
MCH RBC QN AUTO: 32.4 PG (ref 26–34)
MCHC RBC AUTO-ENTMCNC: 34 G/DL (ref 31–36)
MCHC RBC AUTO-ENTMCNC: 34.3 G/DL (ref 31–36)
MCV RBC AUTO: 93.9 FL (ref 80–100)
MCV RBC AUTO: 94.5 FL (ref 80–100)
MICROSCOPIC EXAMINATION: NORMAL
MONOCYTES ABSOLUTE: 0.6 K/UL (ref 0–1.3)
MONOCYTES ABSOLUTE: 0.7 K/UL (ref 0–1.3)
MONOCYTES RELATIVE PERCENT: 10.4 %
MONOCYTES RELATIVE PERCENT: 8.2 %
NEUTROPHILS ABSOLUTE: 3 K/UL (ref 1.7–7.7)
NEUTROPHILS ABSOLUTE: 3.9 K/UL (ref 1.7–7.7)
NEUTROPHILS RELATIVE PERCENT: 42.1 %
NEUTROPHILS RELATIVE PERCENT: 58.1 %
NITRITE, URINE: NEGATIVE
PDW BLD-RTO: 13.2 % (ref 12.4–15.4)
PDW BLD-RTO: 13.4 % (ref 12.4–15.4)
PERFORMED ON: ABNORMAL
PH UA: 6 (ref 5–8)
PLATELET # BLD: 342 K/UL (ref 135–450)
PLATELET # BLD: 349 K/UL (ref 135–450)
PMV BLD AUTO: 7.6 FL (ref 5–10.5)
PMV BLD AUTO: 7.8 FL (ref 5–10.5)
POTASSIUM REFLEX MAGNESIUM: 3.7 MMOL/L (ref 3.5–5.1)
POTASSIUM SERPL-SCNC: 4 MMOL/L (ref 3.5–5.1)
PRO-BNP: 195 PG/ML (ref 0–124)
PROTEIN UA: NEGATIVE MG/DL
PROTHROMBIN TIME: 11.8 SEC (ref 10–13.2)
RBC # BLD: 4.79 M/UL (ref 4.2–5.9)
RBC # BLD: 5.28 M/UL (ref 4.2–5.9)
SODIUM BLD-SCNC: 136 MMOL/L (ref 136–145)
SODIUM BLD-SCNC: 142 MMOL/L (ref 136–145)
SPECIFIC GRAVITY UA: 1.02 (ref 1–1.03)
TOTAL PROTEIN: 7.2 G/DL (ref 6.4–8.2)
TOTAL PROTEIN: 7.5 G/DL (ref 6.4–8.2)
TROPONIN: <0.01 NG/ML
TROPONIN: <0.01 NG/ML
URINE TYPE: NORMAL
UROBILINOGEN, URINE: 0.2 E.U./DL
WBC # BLD: 6.8 K/UL (ref 4–11)
WBC # BLD: 7 K/UL (ref 4–11)

## 2020-07-25 PROCEDURE — 36415 COLL VENOUS BLD VENIPUNCTURE: CPT

## 2020-07-25 PROCEDURE — 83880 ASSAY OF NATRIURETIC PEPTIDE: CPT

## 2020-07-25 PROCEDURE — 80053 COMPREHEN METABOLIC PANEL: CPT

## 2020-07-25 PROCEDURE — 93010 ELECTROCARDIOGRAM REPORT: CPT | Performed by: INTERNAL MEDICINE

## 2020-07-25 PROCEDURE — 85730 THROMBOPLASTIN TIME PARTIAL: CPT

## 2020-07-25 PROCEDURE — 71045 X-RAY EXAM CHEST 1 VIEW: CPT

## 2020-07-25 PROCEDURE — 2500000003 HC RX 250 WO HCPCS: Performed by: EMERGENCY MEDICINE

## 2020-07-25 PROCEDURE — 96375 TX/PRO/DX INJ NEW DRUG ADDON: CPT

## 2020-07-25 PROCEDURE — 83690 ASSAY OF LIPASE: CPT

## 2020-07-25 PROCEDURE — G0480 DRUG TEST DEF 1-7 CLASSES: HCPCS

## 2020-07-25 PROCEDURE — 71260 CT THORAX DX C+: CPT

## 2020-07-25 PROCEDURE — 2580000003 HC RX 258: Performed by: EMERGENCY MEDICINE

## 2020-07-25 PROCEDURE — 93005 ELECTROCARDIOGRAM TRACING: CPT | Performed by: EMERGENCY MEDICINE

## 2020-07-25 PROCEDURE — 85610 PROTHROMBIN TIME: CPT

## 2020-07-25 PROCEDURE — 84484 ASSAY OF TROPONIN QUANT: CPT

## 2020-07-25 PROCEDURE — 6360000002 HC RX W HCPCS: Performed by: EMERGENCY MEDICINE

## 2020-07-25 PROCEDURE — 6360000004 HC RX CONTRAST MEDICATION: Performed by: EMERGENCY MEDICINE

## 2020-07-25 PROCEDURE — 6370000000 HC RX 637 (ALT 250 FOR IP): Performed by: EMERGENCY MEDICINE

## 2020-07-25 PROCEDURE — 81003 URINALYSIS AUTO W/O SCOPE: CPT

## 2020-07-25 PROCEDURE — 85025 COMPLETE CBC W/AUTO DIFF WBC: CPT

## 2020-07-25 PROCEDURE — 70450 CT HEAD/BRAIN W/O DYE: CPT

## 2020-07-25 PROCEDURE — 96374 THER/PROPH/DIAG INJ IV PUSH: CPT

## 2020-07-25 PROCEDURE — 99285 EMERGENCY DEPT VISIT HI MDM: CPT

## 2020-07-25 PROCEDURE — 71046 X-RAY EXAM CHEST 2 VIEWS: CPT

## 2020-07-25 RX ORDER — 0.9 % SODIUM CHLORIDE 0.9 %
1000 INTRAVENOUS SOLUTION INTRAVENOUS ONCE
Status: COMPLETED | OUTPATIENT
Start: 2020-07-25 | End: 2020-07-25

## 2020-07-25 RX ORDER — HYDROXYZINE 50 MG/1
25-50 TABLET, FILM COATED ORAL EVERY 6 HOURS PRN
Qty: 20 TABLET | Refills: 0 | Status: SHIPPED | OUTPATIENT
Start: 2020-07-25 | End: 2020-07-30

## 2020-07-25 RX ORDER — LORAZEPAM 2 MG/ML
1 INJECTION INTRAMUSCULAR ONCE
Status: COMPLETED | OUTPATIENT
Start: 2020-07-25 | End: 2020-07-25

## 2020-07-25 RX ADMIN — SODIUM CHLORIDE 1000 ML: 9 INJECTION, SOLUTION INTRAVENOUS at 09:43

## 2020-07-25 RX ADMIN — LORAZEPAM 1 MG: 2 INJECTION INTRAMUSCULAR; INTRAVENOUS at 10:20

## 2020-07-25 RX ADMIN — SODIUM CHLORIDE 1000 ML: 9 INJECTION, SOLUTION INTRAVENOUS at 18:14

## 2020-07-25 RX ADMIN — IOPAMIDOL 75 ML: 755 INJECTION, SOLUTION INTRAVENOUS at 10:22

## 2020-07-25 RX ADMIN — LIDOCAINE HYDROCHLORIDE: 20 SOLUTION ORAL; TOPICAL at 09:44

## 2020-07-25 RX ADMIN — FAMOTIDINE 20 MG: 10 INJECTION, SOLUTION INTRAVENOUS at 09:44

## 2020-07-25 ASSESSMENT — PAIN DESCRIPTION - LOCATION
LOCATION: CHEST
LOCATION: CHEST

## 2020-07-25 ASSESSMENT — PAIN DESCRIPTION - FREQUENCY: FREQUENCY: CONTINUOUS

## 2020-07-25 ASSESSMENT — PAIN DESCRIPTION - ONSET: ONSET: SUDDEN

## 2020-07-25 ASSESSMENT — PAIN DESCRIPTION - ORIENTATION: ORIENTATION: MID

## 2020-07-25 ASSESSMENT — PAIN DESCRIPTION - DESCRIPTORS: DESCRIPTORS: BURNING

## 2020-07-25 ASSESSMENT — PAIN DESCRIPTION - PAIN TYPE
TYPE: ACUTE PAIN
TYPE: ACUTE PAIN

## 2020-07-25 ASSESSMENT — PAIN SCALES - GENERAL
PAINLEVEL_OUTOF10: 10
PAINLEVEL_OUTOF10: 7

## 2020-07-25 ASSESSMENT — PAIN DESCRIPTION - PROGRESSION: CLINICAL_PROGRESSION: NOT CHANGED

## 2020-07-25 NOTE — ED PROVIDER NOTES
Emergency Department Provider Note  Location: [address] [place] [address]  ED  7/25/2020     Patient Identification  Jennifer Kenyon is a 36 y.o. male    Chief Complaint  Dizziness (Started at approximately 1400 this afternoon. Verbalizes feeling dizzy and light headed. Also verbalizes intermittent R otalgia.)      Mode of Arrival  private car    HPI  (History provided by patient)  This is a 36 y.o. male with a PMH significant for sick sinus syndrome s/p pacemaker presented today for dizziness, headache, chest pain, overall don't feel right. Patient said he was at home around 2pm when he started to feel dizzy. He described an overwhelming sensation of something not feeling right and he initially thought maybe he was having a panic attack. However, he then noticed significant dizziness shon when he stood up and it was accompanied by mild headache, chest pain, palpitations, generalized weakness, and diffuse tingling sensation. He rates his pain 7/10 in intensity. He got concerned that something else is going on so he called his mother to bring him in. Denies gait problem but felt dizzy when he walked in. Mother later arrived and said she is upset that patient's Celexa was discontinued about 2 weeks ago and changed to Cymbalta. She said patient's depression and anxiety has gotten worse since. No concern for HI/SI but patient has gotten into several arguments with his girlfriend in the past week and that is not like him. Mother wanted me to address this today as well. ROS  Review of Systems   Constitutional: Negative for chills and fever. Eyes: Negative for discharge. Respiratory: Positive for shortness of breath. Cardiovascular: Positive for chest pain and palpitations. Gastrointestinal: Negative for abdominal pain and vomiting. Genitourinary: Negative for dysuria and frequency. Musculoskeletal: Negative for gait problem. Skin: Negative for color change and rash.    Neurological: Positive for weakness (generalized weakness), light-headedness, numbness (generalized numbness) and headaches. Negative for tremors, syncope, facial asymmetry and speech difficulty. Psychiatric/Behavioral: Positive for dysphoric mood. Negative for suicidal ideas. The patient is nervous/anxious. I have reviewed the following nursing documentation:  Allergies: No Known Allergies    Past medical history:  has a past medical history of Depression. Past surgical history:  has a past surgical history that includes Pacemaker insertion. Home medications:   Prior to Admission medications    Medication Sig Start Date End Date Taking? Authorizing Provider   dicyclomine (BENTYL) 10 MG capsule Take 1 capsule by mouth every 6 hours as needed (cramps) 4/25/20   Fernanda Hollingsworth MD   citalopram (CELEXA) 40 MG tablet Take 40 mg by mouth daily    Historical Provider, MD   clonazePAM (KLONOPIN) 0.5 MG tablet Take 0.5 mg by mouth 2 times daily as needed. Historical Provider, MD       Social history:  reports that he has quit smoking. He quit after 5.00 years of use. He has never used smokeless tobacco. He reports current alcohol use. He reports that he does not use drugs. Family history:  History reviewed. No pertinent family history. Exam  ED Triage Vitals [07/25/20 1652]   BP Temp Temp Source Pulse Resp SpO2 Height Weight   111/72 98 °F (36.7 °C) Oral 95 18 99 % -- 212 lb (96.2 kg)   Physical Exam  Vitals signs and nursing note reviewed. Constitutional:       General: He is not in acute distress. Appearance: He is well-developed. He is not diaphoretic. HENT:      Head: Normocephalic and atraumatic. Eyes:      General: No visual field deficit or scleral icterus. Right eye: No discharge. Left eye: No discharge. Conjunctiva/sclera: Conjunctivae normal.   Neck:      Musculoskeletal: Neck supple. Trachea: No tracheal deviation.    Cardiovascular:      Rate and Rhythm: Normal rate and regular rhythm. Heart sounds: Normal heart sounds. No murmur. Pulmonary:      Effort: Pulmonary effort is normal. No respiratory distress. Breath sounds: Normal breath sounds. No stridor. No wheezing. Abdominal:      General: There is no distension. Palpations: Abdomen is soft. Tenderness: There is no abdominal tenderness. There is no guarding or rebound. Musculoskeletal:         General: No deformity. Right lower leg: No edema. Left lower leg: No edema. Skin:     General: Skin is warm and dry. Findings: No erythema or rash. Neurological:      Mental Status: He is alert and oriented to person, place, and time. Cranial Nerves: Cranial nerves are intact. No dysarthria or facial asymmetry. Sensory: Sensation is intact. Motor: Motor function is intact. No tremor. Coordination: Coordination is intact. Psychiatric:         Mood and Affect: Mood is anxious. Speech: Speech normal.         Behavior: Behavior is cooperative. MDM/ED Course  ED Medication Orders (From admission, onward)    Start Ordered     Status Ordering Provider    07/25/20 1815 07/25/20 1807  0.9 % sodium chloride bolus  ONCE      Last MAR action:  Stopped - by Parvin Hearing on 07/25/20 at [address] [place] [address]          EKG  The Ekg interpreted by me in the absence of a cardiologist shows. Atrial sensed, ventricularly paced rhythm with a rate of 92  Axis is   Left axis deviation  QTc is  prolonged  LBBB - old    No specific ST-T wave changes appreciated. No evidence of acute ischemia. Compared to prior EKG dated July 17, 2020, patient is no longer tachycardic. Orthostatic VS reviewed. Significant for HR increased by 20 bpm. Patient also reported feeling dizzy on standing. I ordered 1L fluid bolus.      Radiology  Xr Chest (2 Vw)    Result Date: 7/25/2020  EXAMINATION: TWO XRAY VIEWS OF THE CHEST 7/25/2020 4:11 pm COMPARISON: July 25, 2020 HISTORY: [facility] HISTORY: CP TECHNOLOGIST PROVIDED HISTORY: Reason for exam:->CP FINDINGS: Marginal inspiration is noted. No focal area of consolidation or pneumothorax is present. A dual lead permanent cardiac pacemaker is in place. Prominence of the azygos vein is again noted. Osseous structures are stable. 1. No evidence of acute cardiopulmonary disease     Ct Head Wo Contrast    Result Date: 7/25/2020  EXAMINATION: CT OF THE HEAD WITHOUT CONTRAST 7/25/2020 5:22 pm TECHNIQUE: CT of the head was performed without the administration of intravenous contrast. Dose modulation, iterative reconstruction, and/or weight based adjustment of the mA/kV was utilized to reduce the radiation dose to as low as reasonably achievable. COMPARISON: None HISTORY: ORDERING SYSTEM PROVIDED HISTORY: headache, dizzy TECHNOLOGIST PROVIDED HISTORY: Reason for exam:->headache, dizzy Has a \"code stroke\" or \"stroke alert\" been called? ->No Reason for Exam: Troubles walking, talking, HA, dizzy Acuity: Acute Type of Exam: Initial FINDINGS: BRAIN/VENTRICLES:  No masses nor acute intracranial hemorrhage. Intact gray/white matter differentiation without findings of acute ischemia. No mass effect nor midline shift. Patent basilar cisterns and foramen magnum. No hydrocephalus. ORBITS:  Visualized portions appear normal without acute abnormality. SINUSES:  Partial inclusion of a suspected polyp or mucous retention cyst in the right maxillary sinus. Visualized portions otherwise appear normally pneumatized and aerated. SOFT TISSUES/SKULL:  No acute soft tissue abnormality. No acute fracture. No acute intracranial abnormality.      Xr Chest Portable    Result Date: 7/25/2020  EXAMINATION: ONE XRAY VIEW OF THE CHEST 7/25/2020 9:36 am COMPARISON: 07/17/2020 HISTORY: ORDERING SYSTEM PROVIDED HISTORY: chest pain TECHNOLOGIST PROVIDED HISTORY: Reason for exam:->chest pain Reason for Exam: Chest pain Acuity: Acute Type of Exam: Initial FINDINGS: Mediastinal silhouette appears stable. Cardiac leads are redemonstrated. Lungs appear clear. No focal consolidation or overt heart failure identified. No acute bony abnormality appreciated. No acute cardiopulmonary abnormality is identified.        Labs  Results for orders placed or performed during the hospital encounter of 07/25/20   CBC Auto Differential   Result Value Ref Range    WBC 6.8 4.0 - 11.0 K/uL    RBC 4.79 4.20 - 5.90 M/uL    Hemoglobin 15.5 13.5 - 17.5 g/dL    Hematocrit 45.2 40.5 - 52.5 %    MCV 94.5 80.0 - 100.0 fL    MCH 32.4 26.0 - 34.0 pg    MCHC 34.3 31.0 - 36.0 g/dL    RDW 13.2 12.4 - 15.4 %    Platelets 905 633 - 020 K/uL    MPV 7.6 5.0 - 10.5 fL    Neutrophils % 58.1 %    Lymphocytes % 29.3 %    Monocytes % 10.4 %    Eosinophils % 1.2 %    Basophils % 1.0 %    Neutrophils Absolute 3.9 1.7 - 7.7 K/uL    Lymphocytes Absolute 2.0 1.0 - 5.1 K/uL    Monocytes Absolute 0.7 0.0 - 1.3 K/uL    Eosinophils Absolute 0.1 0.0 - 0.6 K/uL    Basophils Absolute 0.1 0.0 - 0.2 K/uL   Comprehensive Metabolic Panel   Result Value Ref Range    Sodium 136 136 - 145 mmol/L    Potassium 4.0 3.5 - 5.1 mmol/L    Chloride 97 (L) 99 - 110 mmol/L    CO2 26 21 - 32 mmol/L    Anion Gap 13 3 - 16    Glucose 171 (H) 70 - 99 mg/dL    BUN 5 (L) 7 - 20 mg/dL    CREATININE 1.1 0.9 - 1.3 mg/dL    GFR Non-African American >60 >60    GFR African American >60 >60    Calcium 9.2 8.3 - 10.6 mg/dL    Total Protein 7.2 6.4 - 8.2 g/dL    Alb 4.6 3.4 - 5.0 g/dL    Albumin/Globulin Ratio 1.8 1.1 - 2.2    Total Bilirubin 0.4 0.0 - 1.0 mg/dL    Alkaline Phosphatase 95 40 - 129 U/L     (H) 10 - 40 U/L    AST 98 (H) 15 - 37 U/L    Globulin 2.6 g/dL   Troponin   Result Value Ref Range    Troponin <0.01 <0.01 ng/mL   Urinalysis, reflex to microscopic   Result Value Ref Range    Color, UA Yellow Straw/Yellow    Clarity, UA Clear Clear    Glucose, Ur Negative Negative mg/dL    Bilirubin Urine Negative Negative    Ketones, Urine Negative Negative mg/dL    Specific Gravity, UA 1.020 1.005 - 1.030    Blood, Urine Negative Negative    pH, UA 6.0 5.0 - 8.0    Protein, UA Negative Negative mg/dL    Urobilinogen, Urine 0.2 <2.0 E.U./dL    Nitrite, Urine Negative Negative    Leukocyte Esterase, Urine Negative Negative    Microscopic Examination Not Indicated     Urine Type NotGiven    POCT Glucose   Result Value Ref Range    POC Glucose 167 (H) 70 - 99 mg/dl    Performed on ACCU-CHEK          - Patient seen and evaluated in triage room and then later in room 8.  36 y.o. male presented for sudden onset of dizziness, headache, chest pain, generalized weakness/numbness, and and overwhelming sense of something is not right. Exam did not show any focal deficit. Orthostatic VS reviewed and notable for 20 bpm increase from supine to standing and patient reported dizziness with it so IV fluid ordered. - Patient was placed on telemetry during his/her ED stay and no malignant dysrhythmia observed. - Pertinent old records reviewed. I then learned that patient was at [facility] [place] [place] earlier in the day for chest pain. Workup there was unremarkable, including negative CTA chest, negative troponin  - Patient was given IVF in the ED. Upon reassessment, patient reported that he felt much better. - Diagnostic studies reviewed. No acute pathology found. - I discussed the results with patient and his mother. His mother is really concerned that patient's anxiety will get worse and wanted me to change his medicine. I explained why SSRI is usually managed by PCP or psychiatrist and these medicine takes weeks to have therapeutic effects. I eventually agreed to provide a script of Atarax for the patient for him to take prn for anxiety over the weekend until he can call his physician on Monday. - Return precautions also discussed. patient verbalized understanding of care plan and agreed to follow-up with PCP as advised.       I estimate there is LOW risk for ACUTE CORONARY SYNDROME, INTRACRANIAL HEMORRHAGE, MALIGNANT DYSRHYTHMIA, MENINGITIS, PNEUMONIA, PULMONARY EMBOLISM, SEPSIS, SUBARACHNOID HEMORRHAGE, SUBDURAL HEMATOMA, or STROKE, thus I consider the discharge disposition reasonable. Padmini Champion and I have discussed the diagnosis and risks, and we agree with discharging home to follow-up with PCP. We also discussed returning to the Emergency Department immediately if new or worsening symptoms occur. We have discussed the symptoms which are most concerning (e.g., changing or worsening pain, weakness, vomiting, fever) that necessitate immediate return. Clinical Impression:  1. Orthostatic lightheadedness    2. Anxiety state          Disposition:  Discharge to home in good condition. Blood pressure 107/79, pulse 81, temperature 98 °F (36.7 °C), temperature source Oral, resp. rate 14, weight 212 lb (96.2 kg), SpO2 95 %. Patient was given scripts for the following medications. I counseled patient how to take these medications. Discharge Medication List as of 7/25/2020  8:23 PM      START taking these medications    Details   hydrOXYzine (ATARAX) 50 MG tablet Take 0.5-1 tablets by mouth every 6 hours as needed for Anxiety, Disp-20 tablet,R-0Print             Disposition referral (if applicable): [place] [address]  [phone]    Schedule an appointment as soon as possible for a visit       This chart was generated in part by using Dragon Dictation system and may contain errors related to that system including errors in grammar, punctuation, and spelling, as well as words and phrases that may be inappropriate. If there are any questions or concerns please feel free to contact the dictating provider for clarification.      Martha Brasher MD  [address] [place] [address] Dulce Tony MD  07/26/20 4733

## 2020-07-25 NOTE — ED NOTES
Spoke with [employer] representative about pt's pacemaker interrogation. No abnormal events recorded on pacemaker. Pt's pacemaker is coded to record events of a HR greater than 200 BPM, none detected. [employer] representative states that leads and battery are all in acceptable condition and charged. Dr. Radha Cotter notified.      Danial Gonsalez RN  07/25/20 9912

## 2020-07-25 NOTE — ED PROVIDER NOTES
[facility] Name: Padmini Champion  MRN: [id number]  [place] 1985  Date of evaluation: 7/25/2020  Provider: Magdalena Gardner MD    CHIEF COMPLAINT       Chief Complaint   Patient presents with    Chest Pain     midsternal chest pain with trouble breathing and difficult urination since 10pm last night         HISTORY OF PRESENT ILLNESS   (Location/Symptom, Timing/Onset, Context/Setting, Quality, Duration, Modifying Factors, Severity)  Note limiting factors. Padmini Champion is a 36 y.o. male with past medical history of depression, alcohol abuse and prior complete heart block status post pacemaker here with chest pain. Patient states that for the last few days he has been having a substernal burning chest pain. Mild nausea without vomiting. No abdominal discomfort at present. Denies fevers or chills. Mild shortness of breath. No lightheadedness, dizziness or syncope. No lower extremity swelling, redness or pain. States that he was recently admitted to the hospital for nausea, vomiting and diarrhea and was diagnosed with a bacteria in his colon took antibiotics. Notes the vomiting and diarrhea has resolved. Patient states he has been drinking alcohol since being discharged from the hospital and is drank at least a case of beer over the last week. HPI    Nursing Notes were reviewed. REVIEW OF SYSTEMS    (2-9 systems for level 4, 10 or more for level 5)     Review of Systems    Please see HPI for pertinent positive and negative review of system findings. A full 10 system ROS was performed and otherwise negative.         PAST MEDICAL HISTORY     Past Medical History:   Diagnosis Date    Depression          SURGICAL HISTORY       Past Surgical History:   Procedure Laterality Date    PACEMAKER INSERTION      left chest         CURRENT MEDICATIONS       Previous Medications    CITALOPRAM (CELEXA) 40 MG TABLET    Take 40 mg by mouth daily Laboratory  [address] [place], [place]. [address]    Phone [phone]   ETHANOL    Narrative:     Performed at:  [facility]. [facility]  [address] [place] [address]. [address]    Phone [phone]       Interpretation per the Radiologist below, if obtained/available at the time of this note:    CT CHEST PULMONARY EMBOLISM W CONTRAST   Final Result   1. No findings of pulmonary embolism. 2. Normal variant venous anatomy in the chest with a dilated accessory   hemiazygos vein as well as other venous collaterals. Of note, the left   brachiocephalic vein appears diminutive with the majority of the contrast   bolus extending to venous collaterals, predominantly the accessory hemiazygos   and azygos veins. Only minimal contrast is present within the superior vena   cava with partial reflux into the azygos vein, and there appears to be   diminutive caliber lower superior vena cava. Thrombosis of the left   brachiocephalic vein and lower superior vena cava is not excluded. 3. Questionable reflux of the contrast bolus into the inferior vena cava   could be related to collateral opacification of the heart via abdominal   venous collaterals arising from the pebbles azygous vein but could also be seen   with right heart dysfunction. 4. Mild right ventricular hypertrophy and mild left ventricular hypertrophy   isolated to the lateral wall. However, no cardiomegaly. 5. Possible hepatic steatosis. XR CHEST PORTABLE   Preliminary Result   No acute cardiopulmonary abnormality is identified. All other labs/imaging were within normal range or not returned as of this dictation.     EMERGENCY DEPARTMENT COURSE and DIFFERENTIAL DIAGNOSIS/MDM:   Vitals:    Vitals:    07/25/20 0919 07/25/20 1032 07/25/20 1112   BP: (!) 156/115 (!) 190/130 (!) 187/113   Pulse: 105 105 112   Resp: 14 18 16   Temp: 98.6 °F (37 °C)     TempSrc: Oral     SpO2: 98% 100% 97%   Weight: 212 lb (96.2 kg)     Height: 5' 9\" (1.753 m)         EKG shows electronic atrially sensed and ventricularly paced rhythm  At a rate of 110 bpm.    Patient presents emergency department today with chest pain. Found to be tachycardic here. Recently admitted for nausea, vomiting and diarrhea. Afebrile. Soft abdomen and notes his vomiting and diarrhea have resolved. Has been drinking heavily at home over this time period. However given his recent hospitalization with pleuritic chest pain tachycardia CT was performed showing no evidence of PE. Patient does have variant of venous pathology but of much lower suspicion for IVC thrombosis of present. Patient with known cardiac disease but no known coronary artery disease. Troponin is negative here. He has had pain for over a week 4 to 5 days did not feel serial troponin testing necessary. Suspect his pain is likely from esophagitis/gastritis associated with his increased drinking. Given Pepcid and GI cocktail here states he feels much improved. Patient was also given Ativan as he states he is out of his Klonopin and has been having panic attacks. My suspicion for alcohol withdrawal is low as he was drinking late last night does have an elevated alcohol level at present. Was given IV fluids. Heart rate improving. Plan to discharge home with outpatient cardiology follow-up. Of note, patient continues have elevated transaminitis likely from alcohol use. Patient was counseled on cessation  MDM    CONSULTS     None    Critical Care:   None    REASSESSMENT          PROCEDURE     Unless otherwise noted below, none     Procedures      FINAL IMPRESSION      1. Chest pain, unspecified type    2. Acute alcoholic intoxication without complication ([address]    3.  Transaminitis            DISPOSITION/PLAN   DISPOSITION Decision To Discharge 07/25/2020 11:14:48 AM        PATIENT REFERRED TO:  [address] [place] [address]  [phone]    Schedule an appointment as soon as possible for a visit         DISCHARGE MEDICATIONS:  New Prescriptions    No medications on file     Controlled Substances Monitoring:     No flowsheet data found.     (Please note that portions of this note were completed with a voice recognition program.  Efforts were made to edit the dictations but occasionally words are mis-transcribed.)    Mila Valdez MD (electronically signed)  Attending Emergency Physician            Mann Nunes MD  07/25/20 9780

## 2020-07-25 NOTE — ED NOTES
AVS provided and reviewed with the patient. The patient verbalized understanding of care at home, follow up care, and emergent symptoms to return for. No questions or concerns verbalized at this time. The patient is alert, oriented, stable, and ambulatory out of the department with family at the time of discharge.        Jaime Carney RN  07/25/20 4230

## 2020-07-26 LAB
EKG ATRIAL RATE: 92 BPM
EKG DIAGNOSIS: NORMAL
EKG P AXIS: 41 DEGREES
EKG P-R INTERVAL: 158 MS
EKG Q-T INTERVAL: 420 MS
EKG QRS DURATION: 168 MS
EKG QTC CALCULATION (BAZETT): 519 MS
EKG R AXIS: -65 DEGREES
EKG T AXIS: 45 DEGREES
EKG VENTRICULAR RATE: 92 BPM

## 2020-07-26 ASSESSMENT — ENCOUNTER SYMPTOMS
VOMITING: 0
COLOR CHANGE: 0
SHORTNESS OF BREATH: 1
EYE DISCHARGE: 0
ABDOMINAL PAIN: 0

## 2020-07-26 NOTE — ED NOTES
Educated pt on x1 prescriptions and discharge paperwork as well as follow-up appointment. Pt verbalizes understanding of all instructions and denies questions. IV discontinued, catheter intact, minimal bleeding at IV site, 2x2 and tape applied, manual pressure held. Pt left ambulatory with parent with all personal belongings, prescriptions x1, and discharge paperwork to private residence. Pt in no distress at this time.        Marisa Velazquez RN  07/25/20 2031

## 2020-07-26 NOTE — PROGRESS NOTES
[facility] check @ [facility] ER 7/26/20. Managing EP:[place] [address]   SteveKiana Truonganabela [address] [place] [address], [address] [place] [address]   [phone]    Reason for check: Dizziness. Available daily battery/lead measurements within expected range. Lead trends stable. No P/R wave  measurements. Mode switch programmed off. No arrhythmias/high rate episodes detected (>200bpm) since last interrogation. Device appears to be currently functioning as programmed    See PACEART report under Cardiology tab.

## 2020-07-27 ENCOUNTER — NURSE ONLY (OUTPATIENT)
Dept: CARDIOLOGY CLINIC | Age: 35
End: 2020-07-27

## 2021-07-01 ENCOUNTER — APPOINTMENT (OUTPATIENT)
Dept: GENERAL RADIOLOGY | Age: 36
End: 2021-07-01
Payer: MEDICARE

## 2021-07-01 ENCOUNTER — HOSPITAL ENCOUNTER (EMERGENCY)
Age: 36
Discharge: HOME | End: 2021-07-01
Payer: MEDICARE

## 2021-07-01 VITALS
BODY MASS INDEX: 30.06 KG/M2 | OXYGEN SATURATION: 100 % | RESPIRATION RATE: 18 BRPM | HEART RATE: 98 BPM | WEIGHT: 210 LBS | SYSTOLIC BLOOD PRESSURE: 160 MMHG | HEIGHT: 70 IN | DIASTOLIC BLOOD PRESSURE: 109 MMHG | TEMPERATURE: 97.5 F

## 2021-07-01 DIAGNOSIS — M25.462 EFFUSION OF LEFT KNEE: Primary | ICD-10-CM

## 2021-07-01 PROCEDURE — 99282 EMERGENCY DEPT VISIT SF MDM: CPT

## 2021-07-01 PROCEDURE — 73562 X-RAY EXAM OF KNEE 3: CPT

## 2021-07-01 RX ORDER — METOPROLOL SUCCINATE 25 MG/1
25 TABLET, EXTENDED RELEASE ORAL DAILY
COMMUNITY

## 2021-07-01 RX ORDER — NAPROXEN 500 MG/1
500 TABLET ORAL 2 TIMES DAILY
Qty: 20 TABLET | Refills: 0 | Status: SHIPPED | OUTPATIENT
Start: 2021-07-01 | End: 2021-07-03 | Stop reason: ALTCHOICE

## 2021-07-01 RX ORDER — ALPRAZOLAM 1 MG/1
1 TABLET ORAL 2 TIMES DAILY PRN
COMMUNITY

## 2021-07-01 ASSESSMENT — PAIN SCALES - GENERAL: PAINLEVEL_OUTOF10: 10

## 2021-07-01 ASSESSMENT — ENCOUNTER SYMPTOMS
ABDOMINAL PAIN: 0
COLOR CHANGE: 0
RHINORRHEA: 0
SORE THROAT: 0
SHORTNESS OF BREATH: 0

## 2021-07-01 ASSESSMENT — PAIN DESCRIPTION - DESCRIPTORS: DESCRIPTORS: ACHING;SHARP

## 2021-07-01 NOTE — ED PROVIDER NOTES
Evaluated by [id number] [facility] Provider          [facility] ED  EMERGENCY DEPARTMENT ENCOUNTER        Pt Name: Bridget Viera  MRN: [id number]  [place] 1985  Dateof evaluation: 7/1/2021  Provider: ROZ Kim CNP  PCP: Cesar Chand  ED Attending: No att. providers found    [address] [facility]       Chief Complaint   Patient presents with    Knee Pain     \"heard a pop yesterday when assisting moving furniture\", hx of right knee torn meniscus       HISTORY OF PRESENTILLNESS   (Location/Symptom, Timing/Onset, Context/Setting, Quality, Duration, Modifying Factors, Severity)  Note limiting factors. Bridget Viera is a 30 y.o. male for left knee pain. Onset was yesterday. Context includes patient reports that he was helping his grandmother move furniture when he heard a pop in his left knee and has had pain since then. Alleviating factors include nothing. Aggravating factors include nothing. Pain is 10/10. Advil earlier today has been used for pain today. Nursing Notes were all reviewed and agreed with or any disagreements were addressed  in the HPI. REVIEW OF SYSTEMS    (2-9 systems for level 4, 10 or more for level 5)     Review of Systems   Constitutional: Negative for fever. HENT: Negative for congestion, rhinorrhea and sore throat. Respiratory: Negative for shortness of breath. Cardiovascular: Negative for chest pain. Gastrointestinal: Negative for abdominal pain. Genitourinary: Negative for decreased urine volume and difficulty urinating. Musculoskeletal: Negative for arthralgias and myalgias. Left knee pain   Skin: Negative for color change and rash. Neurological: Negative for dizziness and light-headedness. Psychiatric/Behavioral: Negative for agitation. All other systems reviewed and are negative. Positives and Pertinent negatives as per HPI. Except as noted above in the ROS, all other systems were reviewed and negative. Please call when ready.   PAST MEDICAL HISTORY     Past Medical History:   Diagnosis Date    Depression          SURGICAL HISTORY       Past Surgical History:   Procedure Laterality Date    PACEMAKER INSERTION      left chest         CURRENT MEDICATIONS       Discharge Medication List as of 7/1/2021 11:41 AM      CONTINUE these medications which have NOT CHANGED    Details   ALPRAZolam (XANAX) 1 MG tablet Take 1 mg by mouth 2 times daily as needed for Sleep. Historical Med      metoprolol succinate (TOPROL XL) 25 MG extended release tablet Take 25 mg by mouth dailyHistorical Med      citalopram (CELEXA) 40 MG tablet Take 40 mg by mouth dailyHistorical Med               ALLERGIES     Patient has no known allergies. FAMILY HISTORY     History reviewed. No pertinent family history. SOCIAL HISTORY       Social History     Socioeconomic History    Marital status: Single     Spouse name: None    Number of children: None    Years of education: None    Highest education level: None   Occupational History    None   Tobacco Use    Smoking status: Former Smoker     Years: 5.00    Smokeless tobacco: Never Used    Tobacco comment: vap   Vaping Use    Vaping Use: Every day    Substances: Nicotine, 3%   Substance and Sexual Activity    Alcohol use: Yes     Comment: occasionally    Drug use: Never    Sexual activity: Yes     Partners: Female   Other Topics Concern    None   Social History Narrative    None     Social Determinants of Health     Financial Resource Strain:     Difficulty of Paying Living Expenses:    Food Insecurity:     Worried About Running Out of Food in the Last Year:     Ran Out of Food in the Last Year:    Transportation Needs:     Lack of Transportation (Medical):      Lack of Transportation (Non-Medical):    Physical Activity:     Days of Exercise per Week:     Minutes of Exercise per Session:    Stress:     Feeling of Stress :    Social Connections:     Frequency of Communication with Friends and Family:     Frequency of Social Gatherings with Friends and Family:     Attends [employer] Services:     Active Member of Clubs or Organizations:     Attends Club or Organization Meetings:     Marital Status:    Intimate Partner Violence:     Fear of Current or Ex-Partner:     Emotionally Abused:     Physically Abused:     Sexually Abused:        SCREENINGS             PHYSICAL EXAM  (up to 7 for level 4, 8 or more for level 5)     ED Triage Vitals [07/01/21 1040]   BP Temp Temp Source Pulse Resp SpO2 Height Weight   (!) 160/109 97.5 °F (36.4 °C) Temporal 98 18 100 % 5' 10\" (1.778 m) 210 lb (95.3 kg)       Physical Exam  Constitutional:       Appearance: He is well-developed. HENT:      Head: Normocephalic and atraumatic. Cardiovascular:      Rate and Rhythm: Normal rate. Pulmonary:      Effort: Pulmonary effort is normal. No respiratory distress. Abdominal:      General: There is no distension. Palpations: Abdomen is soft. Tenderness: There is no abdominal tenderness. Musculoskeletal:         General: Tenderness present. No swelling, deformity or signs of injury. Cervical back: Normal range of motion. Comments: Decreased range of motion left knee due to pain elicited with movement. Tenderness with palpation to the medial and lateral aspects of the left knee. Sensation and pulse intact left foot. No edema ecchymosis or erythema noted   Skin:     General: Skin is warm and dry. Neurological:      Mental Status: He is alert and oriented to person, place, and time. DIAGNOSTIC RESULTS   LABS:    Labs Reviewed - No data to display    All other labs werewithin normal range or not returned as of this dictation. EKG: All EKG's are interpreted by the Emergency Department Physician who either signs or Co-signs this chart in the absence of a cardiologist.  Please see their note for interpretation of EKG.       RADIOLOGY:     Left knee x-ray interpreted by radiologist for  FINDINGS:   No evidence of acute fracture or dislocation.  No focal osseous lesion. Small joint effusion.  No focal soft tissue abnormality. Interpretation per the Radiologist below, if available at the time of this note:    XR KNEE LEFT (3 VIEWS)   Final Result   Small joint effusion      Otherwise, no acute bony or joint abnormality           XR KNEE LEFT (3 VIEWS)    Result Date: 7/1/2021  EXAMINATION: THREE XRAY VIEWS OF THE LEFT KNEE 7/1/2021 10:58 am COMPARISON: None. HISTORY: ORDERING SYSTEM PROVIDED HISTORY: pain TECHNOLOGIST PROVIDED HISTORY: Reason for exam:->pain Reason for Exam: heard a pop yesterday when assisting moving furniture Acuity: Acute Type of Exam: Initial FINDINGS: No evidence of acute fracture or dislocation. No focal osseous lesion. Small joint effusion. No focal soft tissue abnormality. Small joint effusion Otherwise, no acute bony or joint abnormality         PROCEDURES   Unless otherwise noted below, none     Procedures     CRITICAL CARE TIME   N/A    CONSULTS:  None      EMERGENCYDEPARTMENT COURSE and DIFFERENTIAL DIAGNOSIS/MDM:   Vitals:    Vitals:    07/01/21 1040   BP: (!) 160/109   Pulse: 98   Resp: 18   Temp: 97.5 °F (36.4 °C)   TempSrc: Temporal   SpO2: 100%   Weight: 210 lb (95.3 kg)   Height: 5' 10\" (1.778 m)       Patient was given the following medications:  Medications - No data to display    Patient was seen and evaluated by myself. Patient here for concerns for left knee pain. Patient states that he was helping his grandmother move furniture yesterday when he heard a pop and has had pain since. Patient reports that he has had knee problems on the right in the past and sees orthopedist in a few days for follow-up for his right knee. On exam the patient is awake and alert hemodynamically stable nontoxic in appearance. He is neurovascular intact to his left leg. Left knee x-ray was negative for any acute findings.   Patient was provided with an immobilizer Naprosyn and crutches. He was given orthopedic referral however he was encouraged to keep his follow-up orthopedist appointment as well. He was encouraged to return to the ED for worsening symptoms. He was also encouraged to follow-up with his primary care doctor in the next few days. Patient was ultimately discharged home with all questions answered. The patient tolerated their visit well. I have evaluated this patient. My supervising physician was available for consultation. The patient and / or the family were informed of the results of any tests, a time was given to answer questions, a plan was proposed and they agreed with plan. FINAL IMPRESSION      1. Effusion of left knee          DISPOSITION/PLAN   DISPOSITION Decision To Discharge 07/01/2021 11:38:55 AM      PATIENT REFERRED TO:  No follow-up provider specified.     DISCHARGE MEDICATIONS:  Discharge Medication List as of 7/1/2021 11:41 AM      START taking these medications    Details   naproxen (NAPROSYN) 500 MG tablet Take 1 tablet by mouth 2 times daily for 20 doses, Disp-20 tablet, R-0Print             DISCONTINUED MEDICATIONS:  Discharge Medication List as of 7/1/2021 11:41 AM      STOP taking these medications       dicyclomine (BENTYL) 10 MG capsule Comments:   Reason for Stopping:         clonazePAM (KLONOPIN) 0.5 MG tablet Comments:   Reason for Stopping:                      (Please note that portions of this note were completed with a voice recognition program.  Efforts were made to edit the dictations but occasionally words are mis-transcribed.)    ROZ Sparks CNP (electronically signed)         ROZ Sparks CNP  07/01/21 5775

## 2021-07-03 ENCOUNTER — HOSPITAL ENCOUNTER (EMERGENCY)
Age: 36
Discharge: HOME | End: 2021-07-03
Payer: MEDICARE

## 2021-07-03 VITALS
HEIGHT: 70 IN | TEMPERATURE: 99.4 F | OXYGEN SATURATION: 98 % | BODY MASS INDEX: 30.06 KG/M2 | SYSTOLIC BLOOD PRESSURE: 129 MMHG | DIASTOLIC BLOOD PRESSURE: 87 MMHG | HEART RATE: 114 BPM | RESPIRATION RATE: 18 BRPM | WEIGHT: 210 LBS

## 2021-07-03 DIAGNOSIS — S89.92XD INJURY OF LEFT KNEE, SUBSEQUENT ENCOUNTER: Primary | ICD-10-CM

## 2021-07-03 DIAGNOSIS — M25.562 ACUTE PAIN OF LEFT KNEE: ICD-10-CM

## 2021-07-03 DIAGNOSIS — G89.29 OTHER CHRONIC PAIN: ICD-10-CM

## 2021-07-03 PROCEDURE — 6370000000 HC RX 637 (ALT 250 FOR IP): Performed by: NURSE PRACTITIONER

## 2021-07-03 PROCEDURE — 6360000002 HC RX W HCPCS: Performed by: NURSE PRACTITIONER

## 2021-07-03 PROCEDURE — 99285 EMERGENCY DEPT VISIT HI MDM: CPT

## 2021-07-03 PROCEDURE — 96374 THER/PROPH/DIAG INJ IV PUSH: CPT

## 2021-07-03 RX ORDER — PREDNISONE 20 MG/1
60 TABLET ORAL ONCE
Status: COMPLETED | OUTPATIENT
Start: 2021-07-03 | End: 2021-07-03

## 2021-07-03 RX ORDER — CYCLOBENZAPRINE HCL 10 MG
10 TABLET ORAL 3 TIMES DAILY PRN
COMMUNITY
End: 2021-07-03 | Stop reason: SDUPTHER

## 2021-07-03 RX ORDER — CYCLOBENZAPRINE HCL 10 MG
10 TABLET ORAL 3 TIMES DAILY PRN
Qty: 30 TABLET | Refills: 0 | Status: SHIPPED | OUTPATIENT
Start: 2021-07-03 | End: 2021-07-07 | Stop reason: ALTCHOICE

## 2021-07-03 RX ORDER — KETOROLAC TROMETHAMINE 30 MG/ML
30 INJECTION, SOLUTION INTRAMUSCULAR; INTRAVENOUS ONCE
Status: COMPLETED | OUTPATIENT
Start: 2021-07-03 | End: 2021-07-03

## 2021-07-03 RX ORDER — PREDNISONE 10 MG/1
40 TABLET ORAL DAILY
Qty: 16 TABLET | Refills: 0 | Status: SHIPPED | OUTPATIENT
Start: 2021-07-03 | End: 2021-07-07 | Stop reason: ALTCHOICE

## 2021-07-03 RX ADMIN — PREDNISONE 60 MG: 20 TABLET ORAL at 15:06

## 2021-07-03 RX ADMIN — KETOROLAC TROMETHAMINE 30 MG: 30 INJECTION, SOLUTION INTRAMUSCULAR; INTRAVENOUS at 15:06

## 2021-07-03 ASSESSMENT — PAIN DESCRIPTION - ONSET: ONSET: PROGRESSIVE

## 2021-07-03 ASSESSMENT — PAIN DESCRIPTION - DESCRIPTORS: DESCRIPTORS: SHOOTING

## 2021-07-03 ASSESSMENT — PAIN SCALES - GENERAL
PAINLEVEL_OUTOF10: 9
PAINLEVEL_OUTOF10: 9

## 2021-07-03 ASSESSMENT — PAIN DESCRIPTION - ORIENTATION: ORIENTATION: LEFT

## 2021-07-03 ASSESSMENT — PAIN DESCRIPTION - FREQUENCY: FREQUENCY: CONTINUOUS

## 2021-07-03 ASSESSMENT — PAIN DESCRIPTION - LOCATION: LOCATION: LEG

## 2021-07-03 ASSESSMENT — PAIN DESCRIPTION - PAIN TYPE: TYPE: ACUTE PAIN

## 2021-07-03 NOTE — ED NOTES
P to ED via family and reports L knee and leg pain that started three days ago while helping move furniture, reports was evaluated yesterday and given flexeril but that pain has radiated down into the back of the L heel and is not improving, reports he also is out of his percocet that he is prescribed and does not have follow up with that provider until 7/8/21. Pt also reports he has follow up with ORTHO on 7/6/21 from his evaluation yesterday. Pt alert and oriented and without current distress. No edema or obvious deformities noted to L knee or ankle. Circs intact and pulses present distal to site. Presented using crutches today.       Fauzia Kelley RN  07/03/21 [id number]

## 2021-07-03 NOTE — ED NOTES
Pt triage completed and HR remains elevated. Pt reports he has severe anxiety but denies current CP or SOB. Pt placed on cardiac monitor, ST noted on monitor. Resps even/unlabored, lungs CTA throughout, SPO2 WNL on RA. Provider notified of pt condition. Pt alert and oriented and without current distress. Call light within reach.       Mannie Chamorro, JEANETTE  07/03/21 [id number]

## 2021-07-06 ENCOUNTER — OFFICE VISIT (OUTPATIENT)
Dept: ORTHOPEDIC SURGERY | Age: 36
End: 2021-07-06
Payer: MEDICARE

## 2021-07-06 VITALS — WEIGHT: 210 LBS | BODY MASS INDEX: 30.06 KG/M2 | HEIGHT: 70 IN

## 2021-07-06 DIAGNOSIS — S83.242A ACUTE MEDIAL MENISCUS TEAR OF LEFT KNEE, INITIAL ENCOUNTER: Primary | ICD-10-CM

## 2021-07-06 PROCEDURE — G8427 DOCREV CUR MEDS BY ELIG CLIN: HCPCS | Performed by: ORTHOPAEDIC SURGERY

## 2021-07-06 PROCEDURE — G8417 CALC BMI ABV UP PARAM F/U: HCPCS | Performed by: ORTHOPAEDIC SURGERY

## 2021-07-06 PROCEDURE — 1036F TOBACCO NON-USER: CPT | Performed by: ORTHOPAEDIC SURGERY

## 2021-07-06 PROCEDURE — 99204 OFFICE O/P NEW MOD 45 MIN: CPT | Performed by: ORTHOPAEDIC SURGERY

## 2021-07-06 RX ORDER — CELECOXIB 200 MG/1
200 CAPSULE ORAL 2 TIMES DAILY
Qty: 60 CAPSULE | Refills: 3 | Status: SHIPPED | OUTPATIENT
Start: 2021-07-06 | End: 2021-07-07 | Stop reason: ALTCHOICE

## 2021-07-06 NOTE — PROGRESS NOTES
7/6/2021     Reason for visit:  Left knee injury sustained on 6/29/2021    History of Present Illness: The patient is a 29-year-old male who presents for evaluation of his left knee. He reports that he injured himself on 6 29,021. At the time he was helping a family member move their dresser when his knee buckled and felt a popping sensation. Ever since then he has had persistent pain. He localized the pain to the medial aspect of the knee. He has had some swelling. Occasional catching locking. He also reports some instability. Medical History:  Past Medical History:   Diagnosis Date    Depression       Past Surgical History:   Procedure Laterality Date    PACEMAKER INSERTION      left chest      No family history on file. Social History     Socioeconomic History    Marital status: Single     Spouse name: Not on file    Number of children: Not on file    Years of education: Not on file    Highest education level: Not on file   Occupational History    Not on file   Tobacco Use    Smoking status: Former Smoker     Years: 5.00    Smokeless tobacco: Never Used    Tobacco comment: vap   Vaping Use    Vaping Use: Every day    Substances: Nicotine, 3%   Substance and Sexual Activity    Alcohol use: Yes     Comment: occasionally    Drug use: Never    Sexual activity: Yes     Partners: Female   Other Topics Concern    Not on file   Social History Narrative    Not on file     Social Determinants of Health     Financial Resource Strain:     Difficulty of Paying Living Expenses:    Food Insecurity:     Worried About Running Out of Food in the Last Year:     [address] in the Last Year:    Transportation Needs:     Lack of Transportation (Medical):      Lack of Transportation (Non-Medical):    Physical Activity:     Days of Exercise per Week:     Minutes of Exercise per Session:    Stress:     Feeling of Stress :    Social Connections:     Frequency of Communication with Friends and Family:     Frequency of Social Gatherings with Friends and Family:     Attends [employer] Services:     Active Member of Clubs or Organizations:     Attends Club or Organization Meetings:     Marital Status:    Intimate Partner Violence:     Fear of Current or Ex-Partner:     Emotionally Abused:     Physically Abused:     Sexually Abused:       Current Outpatient Medications on File Prior to Visit   Medication Sig Dispense Refill    cyclobenzaprine (FLEXERIL) 10 MG tablet Take 1 tablet by mouth 3 times daily as needed for Muscle spasms 30 tablet 0    predniSONE (DELTASONE) 10 MG tablet Take 4 tablets by mouth daily for 4 days 16 tablet 0    ALPRAZolam (XANAX) 1 MG tablet Take 1 mg by mouth 2 times daily as needed for Sleep.  metoprolol succinate (TOPROL XL) 25 MG extended release tablet Take 25 mg by mouth daily      citalopram (CELEXA) 40 MG tablet Take 40 mg by mouth daily       No current facility-administered medications on file prior to visit. No Known Allergies     Review of Systems:  Constitutional: Patient is adequately groomed with no evidence of malnutrition  Mental Status: The patient is oriented to time, place and person. The patient's mood and affect are appropriate. Lymphatic: The lymphatic examination bilaterally reveals all areas to be without enlargement or induration. Vascular: Examination reveals no swelling or calf tenderness. Peripheral pulses are palpable and 2+. Neurological: The patient has good coordination. There is no weakness or sensory deficit. Skin:  Head/Neck: inspection reveals no rashes, ulcerations or lesions. Trunk: inspection reveals no rashes, ulcerations or lesions.     Objective:  Ht 5' 10\" (1.778 m)   Wt 210 lb (95.3 kg)   BMI 30.13 kg/m²      Physical Exam:  The patient is well-appearing and in no apparent distress  Examination of the left knee   There is a + effusion, no gross deformity or skin changes  Range of motion reveals 5 to 125  neg lachman, negative posterior drawer, no pain or laxity with varus or valgus stress at 0 degrees and 30 degrees of flexion  + medial joint line tenderness  5 out of 5 strength throughout distal muscle groups  Sensation is intact to light touch throughout all distributions  There is no calf swelling or tenderness  Palpable DP pulse, brisk cap refill, 2+ symmetric reflexes    Imaging:  X-rays of the left knee were reviewed. There is no fracture, dislocation, or other abnormality. Assessment:  Left knee pain following injury on 6/29/2021. Concern for possible medial meniscus tear versus other intra-articular process    Plan:  I discussed the patient the differential diagnosis. This point given the acuity of the injury combined with his symptoms and exam I would recommend an MRI for further evaluation. He does have a pacemaker and therefore if the MRI is not feasible of the neck step would be a CT arthrogram.  We will prescribe Celebrex. Following the study he will return to review the results in the office. Greater than 45 minutes were spent with this encounter. Time spent included evaluating the patient's chart prior to arrival.  Evaluating the patient in the office including history, physical examination, imaging reviewing, and counseling on next steps. Lastly, time was spent discussing orders with my staff as well as providing documentation in the chart. Zeina Nunez MD            Orthopaedic Surgery Sports Medicine and [address] and [address] [facility]            Team Physician [facility] [place]      Disclaimer: This note was dictated with voice recognition software. Though review and correction are routine, we apologize for any errors.

## 2021-07-07 ENCOUNTER — VIRTUAL VISIT (OUTPATIENT)
Dept: FAMILY MEDICINE CLINIC | Age: 36
End: 2021-07-07
Payer: MEDICARE

## 2021-07-07 DIAGNOSIS — M10.9 ACUTE GOUT OF LEFT HAND, UNSPECIFIED CAUSE: Primary | ICD-10-CM

## 2021-07-07 PROCEDURE — 99203 OFFICE O/P NEW LOW 30 MIN: CPT | Performed by: FAMILY MEDICINE

## 2021-07-07 PROCEDURE — G8427 DOCREV CUR MEDS BY ELIG CLIN: HCPCS | Performed by: FAMILY MEDICINE

## 2021-07-07 RX ORDER — OXYCODONE HYDROCHLORIDE AND ACETAMINOPHEN 5; 325 MG/1; MG/1
TABLET ORAL
COMMUNITY
Start: 2021-06-10 | End: 2021-07-26 | Stop reason: ALTCHOICE

## 2021-07-07 RX ORDER — METHYLPREDNISOLONE 4 MG/1
TABLET ORAL
Qty: 1 KIT | Refills: 0 | Status: SHIPPED | OUTPATIENT
Start: 2021-07-07 | End: 2021-07-13

## 2021-07-07 SDOH — ECONOMIC STABILITY: HOUSING INSECURITY
IN THE LAST 12 MONTHS, WAS THERE A TIME WHEN YOU DID NOT HAVE A STEADY PLACE TO SLEEP OR SLEPT IN A SHELTER (INCLUDING NOW)?: NO

## 2021-07-07 SDOH — ECONOMIC STABILITY: TRANSPORTATION INSECURITY
IN THE PAST 12 MONTHS, HAS THE LACK OF TRANSPORTATION KEPT YOU FROM MEDICAL APPOINTMENTS OR FROM GETTING MEDICATIONS?: NO

## 2021-07-07 SDOH — ECONOMIC STABILITY: FOOD INSECURITY: WITHIN THE PAST 12 MONTHS, THE FOOD YOU BOUGHT JUST DIDN'T LAST AND YOU DIDN'T HAVE MONEY TO GET MORE.: NEVER TRUE

## 2021-07-07 SDOH — ECONOMIC STABILITY: FOOD INSECURITY: WITHIN THE PAST 12 MONTHS, YOU WORRIED THAT YOUR FOOD WOULD RUN OUT BEFORE YOU GOT MONEY TO BUY MORE.: NEVER TRUE

## 2021-07-07 SDOH — ECONOMIC STABILITY: INCOME INSECURITY: IN THE LAST 12 MONTHS, WAS THERE A TIME WHEN YOU WERE NOT ABLE TO PAY THE MORTGAGE OR RENT ON TIME?: NO

## 2021-07-07 SDOH — ECONOMIC STABILITY: TRANSPORTATION INSECURITY
IN THE PAST 12 MONTHS, HAS LACK OF TRANSPORTATION KEPT YOU FROM MEETINGS, WORK, OR FROM GETTING THINGS NEEDED FOR DAILY LIVING?: NO

## 2021-07-07 ASSESSMENT — PATIENT HEALTH QUESTIONNAIRE - PHQ9
SUM OF ALL RESPONSES TO PHQ QUESTIONS 1-9: 0
2. FEELING DOWN, DEPRESSED OR HOPELESS: 0
1. LITTLE INTEREST OR PLEASURE IN DOING THINGS: 0
SUM OF ALL RESPONSES TO PHQ QUESTIONS 1-9: 0
SUM OF ALL RESPONSES TO PHQ9 QUESTIONS 1 & 2: 0
SUM OF ALL RESPONSES TO PHQ QUESTIONS 1-9: 0

## 2021-07-07 ASSESSMENT — SOCIAL DETERMINANTS OF HEALTH (SDOH): HOW HARD IS IT FOR YOU TO PAY FOR THE VERY BASICS LIKE FOOD, HOUSING, MEDICAL CARE, AND HEATING?: NOT HARD AT ALL

## 2021-07-07 NOTE — PROGRESS NOTES
2021    TELEHEALTH EVALUATION -- Audio/Visual (During [facility]- public health emergency)    HPI:    Cullen Burrell (:  1985) has requested an audio/video evaluation for the following concern(s):    Pt presents today via doxy. me Video visit to establish care. Recently seen in the ED for left knee injury and saw Ortho yesterday. MRI was ordered. Has pacemaker since . Has hx of gout and woke up today with elft thumb edema swollen red, hadrd to bend, usually gets the gout in right big toe. Has had more flares in the last year, usually occur once a month. Had steak and Talopia recently Recently started a plant based diet. Has stopped EtOH. Has a hx of torn meniscus in right knee and was told that it may have been from the gout crystals rubbing in the knee      Review of Systems   Musculoskeletal: Positive for arthralgias (left knee) and joint swelling (left thumb). Prior to Visit Medications    Medication Sig Taking? Authorizing Provider   oxyCODONE-acetaminophen (PERCOCET) 5-325 MG per tablet TAKE 1 TABLET BY MOUTH TWICE A DAY AS NEEDED Yes Historical Provider, MD   methylPREDNISolone (MEDROL DOSEPACK) 4 MG tablet Take by mouth. Yes Merrilyn Councilman,    ALPRAZolam Evaline Beams) 1 MG tablet Take 1 mg by mouth 2 times daily as needed for Sleep.  Yes Historical Provider, MD   metoprolol succinate (TOPROL XL) 25 MG extended release tablet Take 25 mg by mouth daily Yes Historical Provider, MD   citalopram (CELEXA) 40 MG tablet Take 40 mg by mouth daily Yes Historical Provider, MD       Social History     Tobacco Use    Smoking status: Former Smoker     Packs/day: 1.50     Years: 5.00     Pack years: 7.50     Types: Cigarettes     Quit date: 2013     Years since quittin.0    Smokeless tobacco: Never Used    Tobacco comment: vape   Vaping Use    Vaping Use: Some days    Substances: Nicotine, 3%   Substance Use Topics    Alcohol use: Not Currently    Drug use: Never        No Known Allergies,   Past Medical History:   Diagnosis Date    Arthritis     Depression     Gout    ,   Past Surgical History:   Procedure Laterality Date    KNEE SURGERY Right 2020    PACEMAKER INSERTION      left chest       PHYSICAL EXAMINATION:  [ INSTRUCTIONS:  \"[x]\" Indicates a positive item  \"[]\" Indicates a negative item  -- DELETE ALL ITEMS NOT EXAMINED]  Vital Signs: (As obtained by patient/caregiver or practitioner observation)    Height - 5' 10\"  Weight - 210 lb    Constitutional: [x] Appears well-developed and well-nourished [x] No apparent distress      [] Abnormal-   Mental status  [x] Alert and awake  [x] Oriented to person/place/time [x]Able to follow commands      Eyes:  EOM    [x]  Normal  [] Abnormal-  Sclera  []  Normal  [] Abnormal -         Discharge []  None visible  [] Abnormal -    HENT:   [x] Normocephalic, atraumatic. [] Abnormal   [] Mouth/Throat: Mucous membranes are moist.     External Ears [x] Normal  [] Abnormal-     Neck: [x] No visualized mass     Pulmonary/Chest: [x] Respiratory effort normal.  [x] No visualized signs of difficulty breathing or respiratory distress        [] Abnormal-      Musculoskeletal:   [] Normal gait with no signs of ataxia         [x] Normal range of motion of neck        [x] Abnormal-  mildly erythremic/edemic left thumb      Neurological:        [x] No Facial Asymmetry (Cranial nerve 7 motor function) (limited exam to video visit)          [x] No gaze palsy        [] Abnormal-         Skin:        [x] No significant exanthematous lesions or discoloration noted on facial skin         [] Abnormal-            Psychiatric:       [x] Normal Affect [] No Hallucinations        [] Abnormal-     Other pertinent observable physical exam findings-     ASSESSMENT/PLAN:  1. Acute gout of left hand, unspecified cause  - methylPREDNISolone (MEDROL DOSEPACK) 4 MG tablet; Take by mouth. Dispense: 1 kit; Refill: 0  - Uric Acid; Future  - TSH without Reflex;  Future  - Lipid Panel; Future  - CBC Auto Differential; Future  - Comprehensive Metabolic Panel; Future    Pt will have labs done at [facility]. Counseled pt that I do not prescribe or refill Percocet or Xanax. Pt expressed understanding. Return in about 1 month (around 8/7/2021) for Lab Results Follow-up. Halie Mendoza, was evaluated through a synchronous (real-time) audio-video encounter. The patient (or guardian if applicable) is aware that this is a billable service. Verbal consent to proceed has been obtained within the past 12 months. The visit was conducted pursuant to the emergency declaration under the [address] [place] [address], [address] [place] [address] authority and the [email or web address] and [email or web address] General Act. Patient identification was verified, and a caregiver was present when appropriate. The patient was located in a state where the provider was credentialed to provide care. Total time spent on this encounter: Not billed by time    --Sergo Fraser DO on 7/7/2021 at 9:21 AM    An electronic signature was used to authenticate this note.

## 2021-07-20 ENCOUNTER — TELEPHONE (OUTPATIENT)
Dept: ORTHOPEDIC SURGERY | Age: 36
End: 2021-07-20

## 2021-07-20 DIAGNOSIS — S83.242A ACUTE MEDIAL MENISCUS TEAR OF LEFT KNEE, INITIAL ENCOUNTER: Primary | ICD-10-CM

## 2021-07-20 NOTE — TELEPHONE ENCOUNTER
General Question     Subject: MRI LT KNEE  Patient and /or Facility Request: Lisbet Corrales  Contact Number: [phone]    PATIENT WENT TO [facility] THIS MORNING TO GET MRI ON LT KNEE. PATIENT WAS UNABLE TO HAVE MRI BECAUSE OF HIS  PACEMAKER    PATIENT WOULD LIKE TO KNOW WHAT IS THE NEXT STEP.     PLEASE CALL BACK PATIENT AT THE ABOVE NUMBER

## 2021-07-26 ENCOUNTER — APPOINTMENT (OUTPATIENT)
Dept: GENERAL RADIOLOGY | Age: 36
End: 2021-07-26
Payer: MEDICARE

## 2021-07-26 ENCOUNTER — TELEPHONE (OUTPATIENT)
Dept: ORTHOPEDIC SURGERY | Age: 36
End: 2021-07-26

## 2021-07-26 ENCOUNTER — HOSPITAL ENCOUNTER (EMERGENCY)
Age: 36
Discharge: HOME | End: 2021-07-26
Payer: MEDICARE

## 2021-07-26 VITALS
WEIGHT: 207 LBS | BODY MASS INDEX: 29.63 KG/M2 | TEMPERATURE: 99 F | HEIGHT: 70 IN | RESPIRATION RATE: 16 BRPM | OXYGEN SATURATION: 99 % | SYSTOLIC BLOOD PRESSURE: 131 MMHG | DIASTOLIC BLOOD PRESSURE: 87 MMHG | HEART RATE: 95 BPM

## 2021-07-26 DIAGNOSIS — M1A.0710 CHRONIC GOUT OF RIGHT FOOT, UNSPECIFIED CAUSE: Primary | ICD-10-CM

## 2021-07-26 PROCEDURE — 6370000000 HC RX 637 (ALT 250 FOR IP): Performed by: PHYSICIAN ASSISTANT

## 2021-07-26 PROCEDURE — 73630 X-RAY EXAM OF FOOT: CPT

## 2021-07-26 PROCEDURE — 99284 EMERGENCY DEPT VISIT MOD MDM: CPT

## 2021-07-26 RX ORDER — HYDROCODONE BITARTRATE AND ACETAMINOPHEN 5; 325 MG/1; MG/1
1 TABLET ORAL ONCE
Status: COMPLETED | OUTPATIENT
Start: 2021-07-26 | End: 2021-07-26

## 2021-07-26 RX ORDER — CELECOXIB 100 MG/1
100 CAPSULE ORAL 2 TIMES DAILY
COMMUNITY

## 2021-07-26 RX ORDER — PREDNISONE 20 MG/1
60 TABLET ORAL ONCE
Status: COMPLETED | OUTPATIENT
Start: 2021-07-26 | End: 2021-07-26

## 2021-07-26 RX ORDER — COLCHICINE 0.6 MG/1
0.6 TABLET ORAL ONCE
Qty: 1 TABLET | Refills: 0 | Status: SHIPPED | OUTPATIENT
Start: 2021-07-26 | End: 2021-07-26

## 2021-07-26 RX ORDER — COLCHICINE 0.6 MG/1
1.2 TABLET ORAL DAILY
Status: DISCONTINUED | OUTPATIENT
Start: 2021-07-26 | End: 2021-07-26 | Stop reason: HOSPADM

## 2021-07-26 RX ORDER — PREDNISONE 20 MG/1
40 TABLET ORAL DAILY
Qty: 10 TABLET | Refills: 0 | Status: SHIPPED | OUTPATIENT
Start: 2021-07-26 | End: 2021-07-31

## 2021-07-26 RX ORDER — HYDROCODONE BITARTRATE AND ACETAMINOPHEN 5; 325 MG/1; MG/1
1 TABLET ORAL EVERY 6 HOURS PRN
Qty: 6 TABLET | Refills: 0 | Status: SHIPPED | OUTPATIENT
Start: 2021-07-26 | End: 2021-07-29

## 2021-07-26 RX ORDER — PREDNISONE 10 MG/1
10 TABLET ORAL DAILY
COMMUNITY
End: 2021-07-26

## 2021-07-26 RX ADMIN — COLCHICINE 1.2 MG: 0.6 TABLET ORAL at 15:36

## 2021-07-26 RX ADMIN — PREDNISONE 60 MG: 20 TABLET ORAL at 15:36

## 2021-07-26 RX ADMIN — HYDROCODONE BITARTRATE AND ACETAMINOPHEN 1 TABLET: 5; 325 TABLET ORAL at 14:53

## 2021-07-26 ASSESSMENT — PAIN SCALES - GENERAL
PAINLEVEL_OUTOF10: 10
PAINLEVEL_OUTOF10: 10
PAINLEVEL_OUTOF10: 8

## 2021-07-27 ASSESSMENT — ENCOUNTER SYMPTOMS
SHORTNESS OF BREATH: 0
ABDOMINAL PAIN: 0
NAUSEA: 0
VOMITING: 0
BACK PAIN: 0
COUGH: 0

## 2021-07-27 NOTE — ED PROVIDER NOTES
arthralgias. Negative for back pain and neck pain. Skin: Negative for rash. Neurological: Negative for weakness, numbness and headaches. Psychiatric/Behavioral: Negative for confusion. Positives and Pertinent negatives as per HPI. Except as noted above in the ROS, all other systems were reviewed and negative. PAST MEDICAL HISTORY     Past Medical History:   Diagnosis Date    Arthritis     Depression     Gout          SURGICAL HISTORY     Past Surgical History:   Procedure Laterality Date    KNEE SURGERY Right 2020    PACEMAKER INSERTION      left chest         CURRENTMEDICATIONS       Discharge Medication List as of 2021  3:21 PM      CONTINUE these medications which have NOT CHANGED    Details   celecoxib (CELEBREX) 100 MG capsule Take 100 mg by mouth 2 times daily Pt unaware of correct dosageHistorical Med      ALPRAZolam (XANAX) 1 MG tablet Take 1 mg by mouth 2 times daily as needed for Sleep. Historical Med      metoprolol succinate (TOPROL XL) 25 MG extended release tablet Take 25 mg by mouth dailyHistorical Med      citalopram (CELEXA) 40 MG tablet Take 40 mg by mouth dailyHistorical Med               ALLERGIES     Patient has no known allergies.     FAMILYHISTORY       Family History   Problem Relation Age of Onset   [employer] Cancer Mother         lung, brain, thyroid    Heart Disease Father     Bipolar Disorder Brother     No Known Problems Maternal Grandmother     Dementia Maternal Grandfather     Diabetes Maternal Grandfather           SOCIAL HISTORY       Social History     Tobacco Use    Smoking status: Current Every Day Smoker     Packs/day: 1.50     Years: 5.00     Pack years: 7.50     Types: Cigarettes, E-Cigarettes     Last attempt to quit: 2013     Years since quittin.0    Smokeless tobacco: Never Used    Tobacco comment: vape   Vaping Use    Vaping Use: Some days    Substances: Nicotine, 3%   Substance Use Topics    Alcohol use: Not Currently    Drug use: Never       SCREENINGS             PHYSICAL EXAM    (up to 7 for level 4, 8 or more for level 5)     ED Triage Vitals   BP Temp Temp Source Pulse Resp SpO2 Height Weight   07/26/21 1429 07/26/21 1429 07/26/21 1429 07/26/21 1429 07/26/21 1429 07/26/21 1429 07/26/21 1425 07/26/21 1425   131/87 99 °F (37.2 °C) Oral 111 16 99 % 5' 10\" (1.778 m) 207 lb (93.9 kg)       Physical Exam  Vitals reviewed. Constitutional:       Appearance: He is not diaphoretic. HENT:      Nose: No congestion or rhinorrhea. Eyes:      General: No scleral icterus. Conjunctiva/sclera: Conjunctivae normal.   Cardiovascular:      Rate and Rhythm: Normal rate and regular rhythm. Pulses: Normal pulses. Heart sounds: Normal heart sounds. No murmur heard. No friction rub. No gallop. Pulmonary:      Effort: Pulmonary effort is normal. No respiratory distress. Breath sounds: Normal breath sounds. No stridor. No wheezing, rhonchi or rales. Musculoskeletal:         General: Swelling and tenderness present. Normal range of motion. Cervical back: Normal range of motion and neck supple. Comments: Mild redness and swelling of her first metatarsal of right foot. Tenderness to palpation. Pain with movement of first, though not out of proportion to exam.   Skin:     General: Skin is warm and dry. Capillary Refill: Capillary refill takes less than 2 seconds. Neurological:      General: No focal deficit present. Mental Status: He is alert and oriented to person, place, and time. Sensory: No sensory deficit. Motor: No weakness. Psychiatric:         Mood and Affect: Mood normal.         Behavior: Behavior normal.         DIAGNOSTIC RESULTS   LABS:    Labs Reviewed - No data to display    When ordered only abnormal lab results are displayed. All other labs were within normal range or not returned as of this dictation. EKG:  When ordered, EKG's are interpreted by the Emergency Department Physician in the absence of a cardiologist.  Please see their note for interpretation of EKG. RADIOLOGY:   Non-plain film images such as CT, Ultrasound and MRI are read by the radiologist. Plain radiographic images are visualized and preliminarily interpreted by the ED Provider with the below findings:        Interpretation per the Radiologist below, if available at the time of this note:    XR FOOT RIGHT (MIN 3 VIEWS)   Final Result   No acute osseous abnormality           XR FOOT RIGHT (MIN 3 VIEWS)    Result Date: 7/26/2021  EXAMINATION: 3 XRAY VIEWS OF THE RIGHT FOOT 7/26/2021 2:33 pm COMPARISON: None. HISTORY: ORDERING SYSTEM PROVIDED HISTORY: redness injury TECHNOLOGIST PROVIDED HISTORY: Reason for exam:->redness injury Reason for Exam: swelling and pain, hx gout Acuity: Acute Type of Exam: Initial FINDINGS: There is moderate soft tissue swelling involving the forefoot. The osseous structures and joint spaces are intact without evidence of fracture, malalignment or bone destruction. There is no radiopaque foreign body. No acute osseous abnormality           PROCEDURES   Unless otherwise noted below, none     Procedures    CRITICAL CARE TIME   N/A    CONSULTS:  None      EMERGENCY DEPARTMENT COURSE and DIFFERENTIAL DIAGNOSIS/MDM:   Vitals:    Vitals:    07/26/21 1425 07/26/21 1429 07/26/21 1510   BP:  131/87    Pulse:  111 95   Resp:  16    Temp:  99 °F (37.2 °C)    TempSrc:  Oral    SpO2:  99%    Weight: 207 lb (93.9 kg)     Height: 5' 10\" (1.778 m)         Patient was given the following medications:  Medications   HYDROcodone-acetaminophen (NORCO) 5-325 MG per tablet 1 tablet (1 tablet Oral Given 7/26/21 1453)   predniSONE (DELTASONE) tablet 60 mg (60 mg Oral Given 7/26/21 1536)           58-year-old male presents emergency room for right foot pain. States that it was exacerbated by dropping a speaker on it but has been having pain related to gout.   He is adamant that the pain is consistent with prior gout

## 2021-08-17 ENCOUNTER — TELEPHONE (OUTPATIENT)
Dept: FAMILY MEDICINE CLINIC | Age: 36
End: 2021-08-17

## 2021-08-17 NOTE — TELEPHONE ENCOUNTER
Left message Dr Анна Mcpherson not accepting new patients and that patient already established care with Dr Josh Hyde

## 2021-08-17 NOTE — TELEPHONE ENCOUNTER
----- Message from Radhahennyeric Clements sent at 2021  3:56 PM EDT -----  Subject: Appointment Request    Reason for Call: New Patient Request Appointment    QUESTIONS  Type of Appointment? Established Patient  Reason for appointment request? No appointments available during search  Additional Information for Provider? Would like to establish care with Dr Анна Mcpherson  ---------------------------------------------------------------------------  --------------  [address]  What is the best way for the office to contact you? OK to leave message on   voicemail  Preferred Call Back Phone Number? [phone]  ---------------------------------------------------------------------------  --------------  SCRIPT ANSWERS  Relationship to Patient? Self  Specialty Confirmation? Primary Care  Is this the first appointment to establish care for a ? No  Have you been diagnosed with, awaiting test results for, or told that you   are suspected of having COVID-19 (Coronavirus)? (If patient has tested   negative or was tested as a requirement for work, school, or travel and   not based on symptoms, answer no)? No  Do you currently have flu-like symptoms including fever or chills, cough,   shortness of breath, difficulty breathing, or new loss of taste or smell? No  Have you had close contact with someone with COVID-19 in the last 14 days? No  (Service Expert  click yes below to proceed with [employer] As Usual   Scheduling)?  Yes

## 2021-08-30 ENCOUNTER — OFFICE VISIT (OUTPATIENT)
Dept: ORTHOPEDIC SURGERY | Age: 36
End: 2021-08-30
Payer: MEDICARE

## 2021-08-30 ENCOUNTER — TELEPHONE (OUTPATIENT)
Dept: ORTHOPEDIC SURGERY | Age: 36
End: 2021-08-30

## 2021-08-30 VITALS — WEIGHT: 202 LBS | BODY MASS INDEX: 28.92 KG/M2 | HEIGHT: 70 IN

## 2021-08-30 DIAGNOSIS — M25.561 RIGHT KNEE PAIN, UNSPECIFIED CHRONICITY: ICD-10-CM

## 2021-08-30 DIAGNOSIS — M1A.0610 CHRONIC GOUT OF RIGHT KNEE, UNSPECIFIED CAUSE: Primary | ICD-10-CM

## 2021-08-30 PROCEDURE — G8427 DOCREV CUR MEDS BY ELIG CLIN: HCPCS | Performed by: ORTHOPAEDIC SURGERY

## 2021-08-30 PROCEDURE — 4004F PT TOBACCO SCREEN RCVD TLK: CPT | Performed by: ORTHOPAEDIC SURGERY

## 2021-08-30 PROCEDURE — G8417 CALC BMI ABV UP PARAM F/U: HCPCS | Performed by: ORTHOPAEDIC SURGERY

## 2021-08-30 PROCEDURE — 99214 OFFICE O/P EST MOD 30 MIN: CPT | Performed by: ORTHOPAEDIC SURGERY

## 2021-08-30 PROCEDURE — 20610 DRAIN/INJ JOINT/BURSA W/O US: CPT | Performed by: ORTHOPAEDIC SURGERY

## 2021-08-30 RX ORDER — METHYLPREDNISOLONE ACETATE 40 MG/ML
40 INJECTION, SUSPENSION INTRA-ARTICULAR; INTRALESIONAL; INTRAMUSCULAR; SOFT TISSUE ONCE
Status: COMPLETED | OUTPATIENT
Start: 2021-08-30 | End: 2021-08-30

## 2021-08-30 RX ADMIN — METHYLPREDNISOLONE ACETATE 40 MG: 40 INJECTION, SUSPENSION INTRA-ARTICULAR; INTRALESIONAL; INTRAMUSCULAR; SOFT TISSUE at 17:30

## 2021-08-30 NOTE — PROGRESS NOTES
or Organizations:     Attends Club or Organization Meetings:     Marital Status:    Intimate Partner Violence:     Fear of Current or Ex-Partner:     Emotionally Abused:     Physically Abused:     Sexually Abused:        Current Outpatient Medications   Medication Sig Dispense Refill    celecoxib (CELEBREX) 100 MG capsule Take 100 mg by mouth 2 times daily Pt unaware of correct dosage      colchicine (COLCRYS) 0.6 MG tablet Take 1 tablet by mouth once for 1 dose 1 tablet 0    ALPRAZolam (XANAX) 1 MG tablet Take 1 mg by mouth 2 times daily as needed for Sleep.  metoprolol succinate (TOPROL XL) 25 MG extended release tablet Take 25 mg by mouth daily      citalopram (CELEXA) 40 MG tablet Take 40 mg by mouth daily       No current facility-administered medications for this visit. No Known Allergies    Vital signs:  Ht 5' 10\" (1.778 m)   Wt 202 lb (91.6 kg)   BMI 28.98 kg/m²        Right knee exam    Gait: No use of assistive devices. No antalgic gait. Alignment: normal alignment. Inspection/skin: Skin is intact without erythema or ecchymosis. No gross deformity. Palpation: Anterior lateral joint line tenderness    Range of Motion: There is full range of motion. Strength: Normal quadriceps development. Effusion: No effusion or swelling present. Ligamentous stability: No cruciate or collateral ligament instability. Neurologic and vascular: Skin is warm and well-perfused. Sensation is intact to light-touch. Special tests: Negative Johnie sign. Left knee exam    Gait: No use of assistive devices. No antalgic gait. Alignment: normal alignment. Inspection/skin: Skin is intact without erythema or ecchymosis. No gross deformity. Palpation: mild crepitus. no joint line tenderness present. Range of Motion: There is full range of motion. Strength: Normal quadriceps development. Effusion: No effusion or swelling present.      Ligamentous stability: No cruciate or collateral ligament instability. Neurologic and vascular: Skin is warm and well-perfused. Sensation is intact to light-touch. Special tests: Negative Johnie sign. Diagnostics:  Radiology:       Pertinent imaging was obtained, interpreted, and reviewed with the patient today, images only - no report available. Radiographs were obtained and reviewed in the office; 4 views: bilateral PA, bilateral Rivers, bilateral Merchants AND right lateral    Impression: No acute fracture or dislocation. No osseous abnormalities. There is no appreciable soft tissue swelling or joint effusion. There are no lytic or blastic lesions. Office Procedures:  Orders Placed This Encounter   Procedures    XR KNEE RIGHT (MIN 4 VIEWS)     [id number]     Standing Status:   Future     Number of Occurrences:   1     Standing Expiration Date:   8/30/2022     Order Specific Question:   Reason for exam:     Answer:   Pain    XR KNEE LEFT (3 VIEWS)     Standing Status:   Future     Number of Occurrences:   1     Standing Expiration Date:   8/30/2022     Order Specific Question:   Reason for exam:     Answer:   pain    URIC ACID     Standing Status:   Future     Standing Expiration Date:   8/30/2022    UT ARTHROCENTESIS ASPIR&/INJ MAJOR JT/BURSA W/O US       Assessment: 36-year-old male with right knee pain, likely gout    Plan: Pertinent imaging was reviewed. The etiology, natural history, and treatment options for the disorder were discussed. The roles of activity medication, antiinflammatories, injections, bracing, physical therapy, and surgical interventions were all described to the patient and questions were answered. Patient has a benign knee exam is not with the pain he is in today. Upon reviewing his most recent uric acid level his right knee pain is likely due to chronic gout. At this time is a candidate for a intra-articular cortisone injection. He would like to proceed with this.   In addition he will worsen labs for an updated uric acid level. He needs to follow-up with his primary care provider and received a referral to a rheumatologist for further management of his gout. The indications and risks of steroid injection as well as treatment alternatives were discussed with the patient who consented to the procedure. Under sterile conditions and after informed consent was obtained the patient was given an injection into the right knee. 2cc 40 mg of Depo-Medrol and 4 mL of 1% lidocaine were placed in the knee after it was prepped with chlorhexidine. This resulted in good relief of symptoms. There were no complications. The patient was advised to ice the knee this evening and to avoid vigorous activities for the next 2 days. They were advised to call us if there was any erythema, enduration, swelling or increasing pain. Jcchi Usha is in agreement with this plan. All questions were answered to patient's satisfaction and was encouraged to call with any further questions. Total time spent for evaluation, education, and development of treatment plan: 35 minutes    Rajiv Sr, [address] [place] [address]  8/30/2021    During this exam, I, Rajiv Sr PA-C, functioned as a scribe for Dr. Osei Dietrich. The history taking and physical examination were performed by Dr. Osei Dietrich. All counseling during the appointment was performed between the patient and Dr. Osei Dietrich. 8/30/2021 7:41 PM    This dictation was performed with a verbal recognition program (DRAGON) and it was checked for errors. It is possible that there are still dictated errors within this office note. If so, please bring any areas to my attention for an addendum. All efforts were made to ensure that this office note is accurate.     I attest that I met personally with the patient, performed the described exam, reviewed the radiographic studies and medical records associated with this patient and supervised the services that are described above.      Karen Anaya MD

## 2021-08-30 NOTE — PROGRESS NOTES
8/30/21 3:47 PM     Lidocaine Injection      NDC: [address]-53    Lot Number: [id number]    Body Part: right knee

## 2021-09-26 ENCOUNTER — APPOINTMENT (OUTPATIENT)
Dept: CT IMAGING | Age: 36
End: 2021-09-26
Payer: MEDICARE

## 2021-09-26 ENCOUNTER — APPOINTMENT (OUTPATIENT)
Dept: GENERAL RADIOLOGY | Age: 36
End: 2021-09-26
Payer: MEDICARE

## 2021-09-26 ENCOUNTER — HOSPITAL ENCOUNTER (EMERGENCY)
Age: 36
Discharge: LEFT BEFORE DISCHARGE | End: 2021-09-26
Attending: EMERGENCY MEDICINE
Payer: MEDICARE

## 2021-09-26 VITALS
HEIGHT: 69 IN | OXYGEN SATURATION: 100 % | BODY MASS INDEX: 30.36 KG/M2 | RESPIRATION RATE: 14 BRPM | TEMPERATURE: 98.7 F | SYSTOLIC BLOOD PRESSURE: 162 MMHG | WEIGHT: 205 LBS | DIASTOLIC BLOOD PRESSURE: 113 MMHG | HEART RATE: 89 BPM

## 2021-09-26 DIAGNOSIS — K76.0 HEPATIC STEATOSIS: ICD-10-CM

## 2021-09-26 DIAGNOSIS — R74.01 ELEVATED TRANSAMINASE LEVEL: ICD-10-CM

## 2021-09-26 DIAGNOSIS — E87.20 LACTIC ACIDOSIS: Primary | ICD-10-CM

## 2021-09-26 DIAGNOSIS — R03.0 ELEVATED BLOOD PRESSURE READING: ICD-10-CM

## 2021-09-26 DIAGNOSIS — R07.9 CHEST PAIN, UNSPECIFIED TYPE: ICD-10-CM

## 2021-09-26 DIAGNOSIS — R73.9 BLOOD GLUCOSE ELEVATED: ICD-10-CM

## 2021-09-26 DIAGNOSIS — R11.2 NAUSEA AND VOMITING, INTRACTABILITY OF VOMITING NOT SPECIFIED, UNSPECIFIED VOMITING TYPE: ICD-10-CM

## 2021-09-26 DIAGNOSIS — R10.84 GENERALIZED ABDOMINAL PAIN: ICD-10-CM

## 2021-09-26 LAB
A/G RATIO: 1.8 (ref 1.1–2.2)
ALBUMIN SERPL-MCNC: 4.4 G/DL (ref 3.4–5)
ALP BLD-CCNC: 116 U/L (ref 40–129)
ALT SERPL-CCNC: 78 U/L (ref 10–40)
AMPHETAMINE SCREEN, URINE: ABNORMAL
ANION GAP SERPL CALCULATED.3IONS-SCNC: 16 MMOL/L (ref 3–16)
AST SERPL-CCNC: 76 U/L (ref 15–37)
BARBITURATE SCREEN URINE: ABNORMAL
BASOPHILS ABSOLUTE: 0.1 K/UL (ref 0–0.2)
BASOPHILS RELATIVE PERCENT: 1.1 %
BENZODIAZEPINE SCREEN, URINE: ABNORMAL
BILIRUB SERPL-MCNC: 0.4 MG/DL (ref 0–1)
BILIRUBIN URINE: NEGATIVE
BLOOD, URINE: NEGATIVE
BUN BLDV-MCNC: 10 MG/DL (ref 7–20)
CALCIUM SERPL-MCNC: 8.7 MG/DL (ref 8.3–10.6)
CANNABINOID SCREEN URINE: ABNORMAL
CHLORIDE BLD-SCNC: 97 MMOL/L (ref 99–110)
CLARITY: CLEAR
CO2: 21 MMOL/L (ref 21–32)
COCAINE METABOLITE SCREEN URINE: ABNORMAL
COLOR: YELLOW
CREAT SERPL-MCNC: 1 MG/DL (ref 0.9–1.3)
EKG ATRIAL RATE: 84 BPM
EKG DIAGNOSIS: NORMAL
EKG P AXIS: 16 DEGREES
EKG P-R INTERVAL: 162 MS
EKG Q-T INTERVAL: 488 MS
EKG QRS DURATION: 172 MS
EKG QTC CALCULATION (BAZETT): 576 MS
EKG R AXIS: -67 DEGREES
EKG T AXIS: -51 DEGREES
EKG VENTRICULAR RATE: 84 BPM
EOSINOPHILS ABSOLUTE: 0 K/UL (ref 0–0.6)
EOSINOPHILS RELATIVE PERCENT: 0.3 %
GFR AFRICAN AMERICAN: >60
GFR NON-AFRICAN AMERICAN: >60
GLOBULIN: 2.5 G/DL
GLUCOSE BLD-MCNC: 225 MG/DL (ref 70–99)
GLUCOSE URINE: 100 MG/DL
HCT VFR BLD CALC: 44.4 % (ref 40.5–52.5)
HEMOGLOBIN: 15.3 G/DL (ref 13.5–17.5)
KETONES, URINE: NEGATIVE MG/DL
LACTIC ACID, SEPSIS: 2.7 MMOL/L (ref 0.4–1.9)
LACTIC ACID, SEPSIS: 5 MMOL/L (ref 0.4–1.9)
LACTIC ACID: 3.1 MMOL/L (ref 0.4–2)
LEUKOCYTE ESTERASE, URINE: NEGATIVE
LIPASE: 30 U/L (ref 13–60)
LYMPHOCYTES ABSOLUTE: 2.4 K/UL (ref 1–5.1)
LYMPHOCYTES RELATIVE PERCENT: 25.8 %
Lab: ABNORMAL
MCH RBC QN AUTO: 30.4 PG (ref 26–34)
MCHC RBC AUTO-ENTMCNC: 34.6 G/DL (ref 31–36)
MCV RBC AUTO: 88 FL (ref 80–100)
METHADONE SCREEN, URINE: ABNORMAL
MICROSCOPIC EXAMINATION: ABNORMAL
MONOCYTES ABSOLUTE: 0.6 K/UL (ref 0–1.3)
MONOCYTES RELATIVE PERCENT: 6 %
NEUTROPHILS ABSOLUTE: 6.3 K/UL (ref 1.7–7.7)
NEUTROPHILS RELATIVE PERCENT: 66.8 %
NITRITE, URINE: NEGATIVE
OPIATE SCREEN URINE: ABNORMAL
OXYCODONE URINE: POSITIVE
PDW BLD-RTO: 13.9 % (ref 12.4–15.4)
PH UA: 6.5
PH UA: 6.5 (ref 5–8)
PHENCYCLIDINE SCREEN URINE: ABNORMAL
PLATELET # BLD: 279 K/UL (ref 135–450)
PMV BLD AUTO: 8.1 FL (ref 5–10.5)
POTASSIUM REFLEX MAGNESIUM: 3.8 MMOL/L (ref 3.5–5.1)
PROPOXYPHENE SCREEN: ABNORMAL
PROTEIN UA: NEGATIVE MG/DL
RBC # BLD: 5.04 M/UL (ref 4.2–5.9)
SARS-COV-2, NAAT: NOT DETECTED
SODIUM BLD-SCNC: 134 MMOL/L (ref 136–145)
SPECIFIC GRAVITY UA: 1.01 (ref 1–1.03)
TOTAL CK: 737 U/L (ref 39–308)
TOTAL PROTEIN: 6.9 G/DL (ref 6.4–8.2)
TROPONIN: <0.01 NG/ML
TROPONIN: <0.01 NG/ML
URINE TYPE: ABNORMAL
UROBILINOGEN, URINE: 0.2 E.U./DL
WBC # BLD: 9.4 K/UL (ref 4–11)

## 2021-09-26 PROCEDURE — 93010 ELECTROCARDIOGRAM REPORT: CPT | Performed by: INTERNAL MEDICINE

## 2021-09-26 PROCEDURE — 85025 COMPLETE CBC W/AUTO DIFF WBC: CPT

## 2021-09-26 PROCEDURE — 83690 ASSAY OF LIPASE: CPT

## 2021-09-26 PROCEDURE — 96374 THER/PROPH/DIAG INJ IV PUSH: CPT

## 2021-09-26 PROCEDURE — 6370000000 HC RX 637 (ALT 250 FOR IP): Performed by: NURSE PRACTITIONER

## 2021-09-26 PROCEDURE — 84484 ASSAY OF TROPONIN QUANT: CPT

## 2021-09-26 PROCEDURE — 71045 X-RAY EXAM CHEST 1 VIEW: CPT

## 2021-09-26 PROCEDURE — 80307 DRUG TEST PRSMV CHEM ANLYZR: CPT

## 2021-09-26 PROCEDURE — 87635 SARS-COV-2 COVID-19 AMP PRB: CPT

## 2021-09-26 PROCEDURE — 82550 ASSAY OF CK (CPK): CPT

## 2021-09-26 PROCEDURE — 2580000003 HC RX 258: Performed by: NURSE PRACTITIONER

## 2021-09-26 PROCEDURE — 80053 COMPREHEN METABOLIC PANEL: CPT

## 2021-09-26 PROCEDURE — 74177 CT ABD & PELVIS W/CONTRAST: CPT

## 2021-09-26 PROCEDURE — 6360000002 HC RX W HCPCS: Performed by: NURSE PRACTITIONER

## 2021-09-26 PROCEDURE — 93005 ELECTROCARDIOGRAM TRACING: CPT | Performed by: NURSE PRACTITIONER

## 2021-09-26 PROCEDURE — 6360000004 HC RX CONTRAST MEDICATION: Performed by: EMERGENCY MEDICINE

## 2021-09-26 PROCEDURE — 71260 CT THORAX DX C+: CPT

## 2021-09-26 PROCEDURE — 81003 URINALYSIS AUTO W/O SCOPE: CPT

## 2021-09-26 PROCEDURE — 83605 ASSAY OF LACTIC ACID: CPT

## 2021-09-26 PROCEDURE — 99285 EMERGENCY DEPT VISIT HI MDM: CPT

## 2021-09-26 RX ORDER — DEXTROSE AND SODIUM CHLORIDE 5; .9 G/100ML; G/100ML
1000 INJECTION, SOLUTION INTRAVENOUS ONCE
Status: DISCONTINUED | OUTPATIENT
Start: 2021-09-26 | End: 2021-09-26

## 2021-09-26 RX ORDER — MORPHINE SULFATE 2 MG/ML
4 INJECTION, SOLUTION INTRAMUSCULAR; INTRAVENOUS EVERY 30 MIN PRN
Status: DISCONTINUED | OUTPATIENT
Start: 2021-09-26 | End: 2021-09-26 | Stop reason: HOSPADM

## 2021-09-26 RX ORDER — ONDANSETRON 4 MG/1
4 TABLET, ORALLY DISINTEGRATING ORAL EVERY 8 HOURS PRN
Qty: 20 TABLET | Refills: 0 | Status: SHIPPED | OUTPATIENT
Start: 2021-09-26

## 2021-09-26 RX ORDER — DICYCLOMINE HYDROCHLORIDE 10 MG/1
10 CAPSULE ORAL EVERY 6 HOURS PRN
Qty: 20 CAPSULE | Refills: 0 | Status: SHIPPED | OUTPATIENT
Start: 2021-09-26

## 2021-09-26 RX ORDER — 0.9 % SODIUM CHLORIDE 0.9 %
1000 INTRAVENOUS SOLUTION INTRAVENOUS ONCE
Status: COMPLETED | OUTPATIENT
Start: 2021-09-26 | End: 2021-09-26

## 2021-09-26 RX ORDER — LORAZEPAM 2 MG/ML
1 INJECTION INTRAMUSCULAR ONCE
Status: COMPLETED | OUTPATIENT
Start: 2021-09-26 | End: 2021-09-26

## 2021-09-26 RX ORDER — ONDANSETRON 2 MG/ML
4 INJECTION INTRAMUSCULAR; INTRAVENOUS EVERY 30 MIN PRN
Status: DISCONTINUED | OUTPATIENT
Start: 2021-09-26 | End: 2021-09-26 | Stop reason: HOSPADM

## 2021-09-26 RX ORDER — HYDROXYZINE PAMOATE 25 MG/1
25 CAPSULE ORAL ONCE
Status: COMPLETED | OUTPATIENT
Start: 2021-09-26 | End: 2021-09-26

## 2021-09-26 RX ADMIN — SODIUM CHLORIDE 1000 ML: 9 INJECTION, SOLUTION INTRAVENOUS at 14:29

## 2021-09-26 RX ADMIN — HYDROXYZINE PAMOATE 25 MG: 25 CAPSULE ORAL at 11:30

## 2021-09-26 RX ADMIN — IOPAMIDOL 75 ML: 755 INJECTION, SOLUTION INTRAVENOUS at 12:21

## 2021-09-26 RX ADMIN — LORAZEPAM 1 MG: 2 INJECTION INTRAMUSCULAR; INTRAVENOUS at 12:56

## 2021-09-26 RX ADMIN — SODIUM CHLORIDE 1000 ML: 9 INJECTION, SOLUTION INTRAVENOUS at 10:00

## 2021-09-26 ASSESSMENT — PAIN SCALES - GENERAL: PAINLEVEL_OUTOF10: 10

## 2021-09-26 ASSESSMENT — PAIN DESCRIPTION - LOCATION: LOCATION: ABDOMEN

## 2021-09-26 ASSESSMENT — PAIN DESCRIPTION - PAIN TYPE: TYPE: ACUTE PAIN

## 2021-09-26 NOTE — ED NOTES
Bed: 19  Expected date:   Expected time:   Means of arrival:   Comments:  Jana Salgado RN  09/26/21 7643

## 2021-09-26 NOTE — ED NOTES
Risks of leaving AMA discussed, including sepsis and death. Patient understands this and is comfortable assuming the risk. Instructed to return for worsening symptoms. AMA paper signed by patient.       Cb Najera RN  09/26/21 0271

## 2021-09-26 NOTE — ED NOTES
Patient very anxious. Removed telemetry and BP cuff. Up walking around room, requesting door open. Medicated as ordered. Awaiting re-eval by NP.      Raymond Escobedo RN  09/26/21 [id number]

## 2021-09-26 NOTE — ED PROVIDER NOTES
I independently examined and evaluated Sulma Lawrence. In brief, patient is a 31yoM who presents to the ED for evaluation of abdominal pain and vomiting. Focused exam revealed male in moderate distress due to nausea. He had elevated lactate of 5. He was given 1L IVF bolus. Repeat lactate was elevated at 2.7. After 2nd L bolus, repeat lactate worse at 3. Offered admission but patient left AMA. Patient demonstrated capacity to make decisions. Discharge. The Ekg interpreted by me shows  normal sinus rhythm with a rate of 85  Axis is   Left axis deviation  QTc is  Prolonged   LVH with QRS widening. Inferior infarct, age undetermined. ST Segments: nonspecific changes    All diagnostic, treatment, and disposition decisions were made by myself in conjunction with the advanced practice provider. For all further details of the patient's emergency department visit, please see the advanced practice provider's documentation. Comment: Please note this report has been produced using speech recognition software and may contain errors related to that system including errors in grammar, punctuation, and spelling, as well as words and phrases that may be inappropriate. If there are any questions or concerns please feel free to contact the dictating provider for clarification.         Merlinda Guernsey, MD  09/28/21 3561

## 2021-09-26 NOTE — ED PROVIDER NOTES
Evaluated by [id number] [facility] Provider    [address] [facility]  ED    CHIEF COMPLAINT  Abdominal Pain (c/o abd pain, n/v since yesterday. states mother dx with covid one week ago. ) and Emesis    HISTORY OF PRESENT ILLNESS  Bridget Viera is a 30 y.o. male who presents to the ED complaining of abdominal pain. Reports abdominal pain and vomiting. Pain started last night, he took some pepto and went to bed. Woke up today and was vomiting, acid taste, dry heaving. Pain is all over. Denies diarrhea. Feels hot, no fever or chills. Has had some chest pain in the center of the chest, this started this morning. Denies SOB. Denies HA, congestion, sore throat. Reports body aches. Mom diagnosed with COVID last week, she is admitted currently. Patient has not been vaccinated. Has had no prior abdominal surgeries. He has a PPM but can't say why, had it since a child. The patient is currently rating their pain as 0/10. When he is having pain it is 10/10. Treatments tried prior to arrival in the ED: pepto. The patient arrived to the ED via EMS transport. PAST MEDICAL HISTORY    Past Medical History:   Diagnosis Date    Arthritis     Depression     Gout        SURGICAL HISTORY    Past Surgical History:   Procedure Laterality Date    KNEE SURGERY Right 2020    PACEMAKER INSERTION      left chest       CURRENT MEDICATIONS    Current Outpatient Rx   Medication Sig Dispense Refill    ondansetron (ZOFRAN ODT) 4 MG disintegrating tablet Take 1 tablet by mouth every 8 hours as needed for Nausea 20 tablet 0    dicyclomine (BENTYL) 10 MG capsule Take 1 capsule by mouth every 6 hours as needed (cramps) 20 capsule 0    celecoxib (CELEBREX) 100 MG capsule Take 100 mg by mouth 2 times daily Pt unaware of correct dosage      colchicine (COLCRYS) 0.6 MG tablet Take 1 tablet by mouth once for 1 dose 1 tablet 0    ALPRAZolam (XANAX) 1 MG tablet Take 1 mg by mouth 2 times daily as needed for Sleep.       metoprolol [employer]:     Active Member of Clubs or Organizations:     Attends Club or Organization Meetings:     Marital Status:    Intimate Partner Violence:     Fear of Current or Ex-Partner:     Emotionally Abused:     Physically Abused:     Sexually Abused:        REVIEW OFSYSTEMS    10systems reviewed, pertinent positives per HPI otherwise noted to be negative. PHYSICAL EXAM  Physical Exam  Vitals:    09/26/21 1615   BP: (!) 162/113   Pulse: 89   Resp: 14   Temp:    SpO2: 100%       GENERAL: Patient is well-developed, well-nourished. Awake andalert. Cooperative. Resting in bed. Moderately distressed due to nausea, pain. HEENT:  Normocephalic, atraumatic. Conjunctivaappear normal. Sclera is non-icteric. External ears are normal.    NECK: Supple with normal ROM. Tracheamidline  LUNGS: Equal and symmetric chest rise. Breathing is unlabored. Speaking comfortably in fullsentences. Lungs are clear bilaterally to auscultation. Without wheezing, rales, or rhonchi. CADIOVASCULAR: Tachycardic rate and rhythm. Normal S1-S2 sounds. No murmurs, rubs, or gallops. GI:  Soft, generalized abdominal tenderness to palpation, nondistended with positive bowel sounds. No rebound tenderness, guarding or rigidity. Negative Charles's sign. Negative Rovsing's sign. No CVAT to palpation. No masses or hepatosplenomegaly to palpation. MUSCULOSKELETAL:  No gross deformities or trauma noted. Moving all extremities equally and appropriately. Normal ROM. SKIN: Warm/dry. Skin is intact. No rashes or lesions noted. PSYCHIATRIC: Mood and affect appropriate. Speech is clear and articulate. NEUROLOGIC: Alert and oriented. No focal motor or sensory deficits.      LABS   Results for orders placed or performed during the hospital encounter of 09/26/21   COVID-19, Rapid    Specimen: Nasopharyngeal Swab   Result Value Ref Range    SARS-CoV-2, NAAT Not Detected Not Detected   CBC Auto Differential   Result Value Ref Range    WBC 9.4 4.0 - 11.0 K/uL    RBC 5.04 4.20 - 5.90 M/uL    Hemoglobin 15.3 13.5 - 17.5 g/dL    Hematocrit 44.4 40.5 - 52.5 %    MCV 88.0 80.0 - 100.0 fL    MCH 30.4 26.0 - 34.0 pg    MCHC 34.6 31.0 - 36.0 g/dL    RDW 13.9 12.4 - 15.4 %    Platelets 965 252 - 156 K/uL    MPV 8.1 5.0 - 10.5 fL    Neutrophils % 66.8 %    Lymphocytes % 25.8 %    Monocytes % 6.0 %    Eosinophils % 0.3 %    Basophils % 1.1 %    Neutrophils Absolute 6.3 1.7 - 7.7 K/uL    Lymphocytes Absolute 2.4 1.0 - 5.1 K/uL    Monocytes Absolute 0.6 0.0 - 1.3 K/uL    Eosinophils Absolute 0.0 0.0 - 0.6 K/uL    Basophils Absolute 0.1 0.0 - 0.2 K/uL   Comprehensive Metabolic Panel w/ Reflex to MG   Result Value Ref Range    Sodium 134 (L) 136 - 145 mmol/L    Potassium reflex Magnesium 3.8 3.5 - 5.1 mmol/L    Chloride 97 (L) 99 - 110 mmol/L    CO2 21 21 - 32 mmol/L    Anion Gap 16 3 - 16    Glucose 225 (H) 70 - 99 mg/dL    BUN 10 7 - 20 mg/dL    CREATININE 1.0 0.9 - 1.3 mg/dL    GFR Non-African American >60 >60    GFR African American >60 >60    Calcium 8.7 8.3 - 10.6 mg/dL    Total Protein 6.9 6.4 - 8.2 g/dL    Albumin 4.4 3.4 - 5.0 g/dL    Albumin/Globulin Ratio 1.8 1.1 - 2.2    Total Bilirubin 0.4 0.0 - 1.0 mg/dL    Alkaline Phosphatase 116 40 - 129 U/L    ALT 78 (H) 10 - 40 U/L    AST 76 (H) 15 - 37 U/L    Globulin 2.5 g/dL   Lipase   Result Value Ref Range    Lipase 30.0 13.0 - 60.0 U/L   Troponin   Result Value Ref Range    Troponin <0.01 <0.01 ng/mL   Urinalysis, reflex to microscopic   Result Value Ref Range    Color, UA Yellow Straw/Yellow    Clarity, UA Clear Clear    Glucose, Ur 100 (A) Negative mg/dL    Bilirubin Urine Negative Negative    Ketones, Urine Negative Negative mg/dL    Specific Gravity, UA 1.015 1.005 - 1.030    Blood, Urine Negative Negative    pH, UA 6.5 5.0 - 8.0    Protein, UA Negative Negative mg/dL    Urobilinogen, Urine 0.2 <2.0 E.U./dL    Nitrite, Urine Negative Negative    Leukocyte Esterase, Urine Negative Negative    Microscopic and ventricle. Injection of contrast is through the left arm. There is focal stenosis at the brachiocephalic-caval confluence, with preferential flow of contrast through multiple mediastinal collaterals in the azygous/hemiazygous system, which is distended. Left ventricular hypertrophy is noted. There is no pericardial effusion. No mediastinal or hilar adenopathy. Lungs/pleura: No focal airspace disease, pleural effusion or pulmonary nodule is identified. Upper Abdomen: The adrenal glands are incompletely visualized and appear unremarkable. Soft Tissues/Bones: No acute osseous abnormality. Chest wall collateral veins are noted. No pulmonary embolus. Stable hemodynamically significant stenosis at the brachiocephalic-SVC confluence, with preferential flow of contrast through a well-developed venous collaterals, including the azygous/hemiazygous system. Left subclavian pacer wires which course through the stenosis to the level of the right atrium and ventricle are unchanged. No acute cardiopulmonary disease. XR CHEST PORTABLE    Result Date: 9/26/2021  EXAMINATION: ONE XRAY VIEW OF THE CHEST 9/26/2021 10:40 am COMPARISON: 07/25/2020 HISTORY: ORDERING SYSTEM PROVIDED HISTORY: CP TECHNOLOGIST PROVIDED HISTORY: Reason for exam:->CP Reason for Exam: Abdominal Pain (c/o abd pain, n/v since yesterday. states mother dx with covid one week ago. ) FINDINGS: Lung volumes are low, accentuating heart size and bronchovascular markings at the lung bases Left-sided pacer is again seen. There is hazy right basilar opacity. No focal consolidation on the left. .  No pulmonary edema     Right lower lobe airspace disease, favored to represent atelectasis from low lung volumes rather than pneumonia. CT CHEST PULMONARY EMBOLISM W CONTRAST    Addendum Date: 9/26/2021    ADDENDUM: CT abdomen and pelvis report was initially omitted. Findings are provided is identified. Organs:  There is mild diffuse decreased attenuation in the liver. No gallbladder stones are seen. The pancreas, spleen and adrenal glands are unremarkable. The kidneys enhance symmetrically. There is no hydronephrosis. GI/Bowel: The stomach and small bowel are unremarkable. There is no focal mural thickening of the colon. No pericolonic edema is seen. The mesenteric vasculature enhances in normal fashion. Pelvis: Urinary bladder and prostate gland are unremarkable. Peritoneum/Retroperitoneum: There is mild aortic calcification, without aneurysm. No adenopathy. Azygous/pebbles azygous system is again noted to be engorged. No venous thrombus is identified. In the iliocaval vessels are widely patent. Bones/Soft Tissues: No acute osseous injury. Abdominal wall is unremarkable. IMPRESSION (for abdomen and pelvis)- No acute abdominopelvic abnormality. Hepatic steatosis. Result Date: 9/26/2021  EXAMINATION: CTA OF THE CHEST; CT OF THE ABDOMEN AND PELVIS WITH CONTRAST 9/26/2021 12:09 pm TECHNIQUE: CTA of the chest was performed after the administration of intravenous contrast.  Multiplanar reformatted images are provided for review. MIP images are provided for review. Dose modulation, iterative reconstruction, and/or weight based adjustment of the mA/kV was utilized to reduce the radiation dose to as low as reasonably achievable.; CT of the abdomen and pelvis was performed with the administration of intravenous contrast. Multiplanar reformatted images are provided for review. Dose modulation, iterative reconstruction, and/or weight based adjustment of the mA/kV was utilized to reduce the radiation dose to as low as reasonably achievable. COMPARISON: Chest x-ray, today.   CTA PA, 07/25/2020 HISTORY: ORDERING SYSTEM PROVIDED HISTORY:  TECHNOLOGIST PROVIDED HISTORY: Reason for exam:-> Decision Support Exception - unselect if not a suspected or confirmed emergency medical condition->Emergency Medical Condition (MA) Reason for Exam: chest pain starting this am, no hx Blood work: No evidence of systemic infection. No anemia. No significant electrolyte abnormality. No evidence of acute kidney injury. There is an elevation of liver enzymes his ALT and AST are elevated at 78/76. When I did review prior results these have been much more elevated in the past.  Lactic acid level elevated at 5. EKG: EKG shows sinus rhythm. No acute findings. Patient does have a pacemaker in place. UA: Urinalysis is without evidence of infection. No blood to suggest kidney stone. Imaging: Chest x-ray shows right lower lobe airspace disease favored to be atelectasis over pneumonia. CT of the chest abdomen and pelvis was obtained. There is no pulmonary embolus. There are stable hemodynamically significant stenosis at the brachiocephalic SVC confluence with preferential flow of contrast through well-developed venous collaterals including the azygous and hemiazygous system. Left subclavian pacer wires which coursed through the stenosis to the level of the right atrium and ventricle are unchanged. No acute cardiopulmonary disease. CT of the abdomen and pelvis shows no acute abdominopelvic abnormality. Hepatic steatosis. Patient was given 1 L IV fluids, I did not give the 30 mL/kg of her sepsis criteria due to my concern that he may have Covid repeat lactic acid level did decrease to 2.7. Patient was ordered an additional 1 L of IV fluids.     Medications given in the ED:   Medications   ondansetron (ZOFRAN) injection 4 mg (has no administration in time range)   morphine (PF) injection 4 mg (has no administration in time range)   0.9 % sodium chloride bolus (0 mLs IntraVENous Stopped 9/26/21 1157)   hydrOXYzine (VISTARIL) capsule 25 mg (25 mg Oral Given 9/26/21 1130)   iopamidol (ISOVUE-370) 76 % injection 75 mL (75 mLs IntraVENous Given 9/26/21 1221)   LORazepam (ATIVAN) injection 1 mg (1 mg IntraVENous Given 9/26/21 1256)   0.9 % sodium chloride bolus (0 mLs IntraVENous Stopped 9/26/21 1531)      Repeat lactic acid level remains elevated at 3.1. At this point ED attending physician Dr. Kate Rausch and I determined that the patient needed to be admitted as we do not have a cause for his lactic acidosis and it is not resolving. I discussed this with the patient and his significant other who is present in the room and patient stated that he did not want to stay. I did advise him that if he leaves [address] since we cannot resolve his lactic acidosis or find a source this could result in severe disabling condition and could even result in death. Patient verbalized his understanding with this and stated that he wanted to leave. He stated that he did not want to come in the first place and his grandmother was the one who called the ambulance. I did instruct the patient to follow-up with his primary care provider tomorrow to discuss his ED visit today. I advised him he needs to discuss with him his elevated blood sugars as well as his elevated blood pressure and the lactic acidosis. He was also instructed to return to the ER if he wanted to continue his work-up. Patient was sent home with a prescription for below medication/s. I did [place] patient on appropriate use of these medication. New Prescriptions    DICYCLOMINE (BENTYL) 10 MG CAPSULE    Take 1 capsule by mouth every 6 hours as needed (cramps)    ONDANSETRON (ZOFRAN ODT) 4 MG DISINTEGRATING TABLET    Take 1 tablet by mouth every 8 hours as needed for Nausea       I have evaluated the patient for the following diagnoses: PE, ACS, thoracic aneurysm, Covid, pneumonia, sepsis, infection, ACUTE APPENDICITIS, BOWEL OBSTRUCTION, CHOLECYSTITIS, DIVERTICULITIS, INCARCERATED HERNIA, PANCREATITIS, or PERFORATED BOWEL    The total critical care time spent while evaluating and treating this patient was 53 minutes. This excludes time spent doing separately billable procedures.   This includes time at the bedside, data interpretation, medication management, obtaining critical history from collateral sources if the patient is unable to provide it directly, and physician consultation. Specifics of interventions taken and potentially life-threatening diagnostic considerations are listed above in the medical decision making. CLINICAL IMPRESSION    1. Lactic acidosis    2. Blood glucose elevated    3. Elevated blood pressure reading    4. Elevated transaminase level    5. Hepatic steatosis    6. Nausea and vomiting, intractability of vomiting not specified, unspecified vomiting type    7. Generalized abdominal pain    8. Chest pain, unspecified type       Discharge Vitals:  Blood pressure (!) 162/113, pulse 89, temperature 98.7 °F (37.1 °C), temperature source Oral, resp. rate 14, height 5' 9\" (1.753 m), weight 205 lb (93 kg), SpO2 100 %. FOLLOW UP  Bea Guzman DO  [address]  [phone]    Call in 1 day  For further evaluation    [facility]  ED  [address]  Go to   If symptoms worsen      DISPOSITION  AMA. Comment: Pleasenote this report has been produced using speech recognition software and may contain errors related to that system including errors in grammar, punctuation, and spelling, as well as words and phrases that may beinappropriate. If there are any questions or concerns please feel free to contact the dictating provider for clarification.        RZO Aleman CNP  09/26/21 [address], APRN - CNP  09/26/21 [id number]

## 2021-09-26 NOTE — ED NOTES
Resting in bed with eyes closed at this time, no acute distress noted.      Manny Brown RN  09/26/21 [id number]

## 2021-09-27 LAB
EKG ATRIAL RATE: 85 BPM
EKG DIAGNOSIS: NORMAL
EKG P AXIS: -23 DEGREES
EKG P-R INTERVAL: 164 MS
EKG Q-T INTERVAL: 468 MS
EKG QRS DURATION: 174 MS
EKG QTC CALCULATION (BAZETT): 556 MS
EKG R AXIS: -65 DEGREES
EKG T AXIS: -39 DEGREES
EKG VENTRICULAR RATE: 85 BPM

## 2021-09-27 PROCEDURE — 93010 ELECTROCARDIOGRAM REPORT: CPT | Performed by: INTERNAL MEDICINE
